# Patient Record
Sex: MALE | Race: WHITE | NOT HISPANIC OR LATINO | Employment: OTHER | URBAN - METROPOLITAN AREA
[De-identification: names, ages, dates, MRNs, and addresses within clinical notes are randomized per-mention and may not be internally consistent; named-entity substitution may affect disease eponyms.]

---

## 2024-04-24 ENCOUNTER — OFFICE VISIT (OUTPATIENT)
Dept: NEUROSURGERY | Facility: CLINIC | Age: 74
End: 2024-04-24
Payer: MEDICARE

## 2024-04-24 ENCOUNTER — PREP FOR SURGERY (OUTPATIENT)
Dept: OTHER | Facility: HOSPITAL | Age: 74
End: 2024-04-24
Payer: MEDICARE

## 2024-04-24 VITALS — HEIGHT: 73 IN | BODY MASS INDEX: 26.43 KG/M2 | TEMPERATURE: 97 F | WEIGHT: 199.4 LBS

## 2024-04-24 DIAGNOSIS — M48.062 SPINAL STENOSIS, LUMBAR REGION, WITH NEUROGENIC CLAUDICATION: Primary | ICD-10-CM

## 2024-04-24 DIAGNOSIS — M47.819 FACET ARTHROPATHY: ICD-10-CM

## 2024-04-24 DIAGNOSIS — M53.86 SCIATICA ASSOCIATED WITH DISORDER OF LUMBAR SPINE: Primary | ICD-10-CM

## 2024-04-24 PROBLEM — R29.818 NEUROGENIC CLAUDICATION: Status: ACTIVE | Noted: 2024-04-24

## 2024-04-24 PROCEDURE — 99204 OFFICE O/P NEW MOD 45 MIN: CPT | Performed by: NEUROLOGICAL SURGERY

## 2024-04-24 RX ORDER — LEVETIRACETAM 500 MG/1
TABLET ORAL
COMMUNITY
Start: 2024-02-10

## 2024-04-24 RX ORDER — ATORVASTATIN CALCIUM 80 MG/1
TABLET, FILM COATED ORAL
COMMUNITY
Start: 2024-01-11

## 2024-04-24 RX ORDER — SODIUM CHLORIDE 0.9 % (FLUSH) 0.9 %
3-10 SYRINGE (ML) INJECTION AS NEEDED
OUTPATIENT
Start: 2024-04-24

## 2024-04-24 RX ORDER — BUDESONIDE 9 MG/1
1 TABLET, FILM COATED, EXTENDED RELEASE ORAL DAILY
COMMUNITY
Start: 2024-02-09 | End: 2024-05-09

## 2024-04-24 RX ORDER — SODIUM CHLORIDE 0.9 % (FLUSH) 0.9 %
3 SYRINGE (ML) INJECTION EVERY 12 HOURS SCHEDULED
OUTPATIENT
Start: 2024-04-24

## 2024-04-24 RX ORDER — SODIUM CHLORIDE, SODIUM LACTATE, POTASSIUM CHLORIDE, CALCIUM CHLORIDE 600; 310; 30; 20 MG/100ML; MG/100ML; MG/100ML; MG/100ML
9 INJECTION, SOLUTION INTRAVENOUS CONTINUOUS
OUTPATIENT
Start: 2024-04-24

## 2024-04-24 RX ORDER — ACETAMINOPHEN 325 MG/1
650 TABLET ORAL ONCE
OUTPATIENT
Start: 2024-04-24 | End: 2024-04-24

## 2024-04-24 RX ORDER — METOPROLOL SUCCINATE 25 MG/1
TABLET, EXTENDED RELEASE ORAL
COMMUNITY
Start: 2024-03-29

## 2024-04-24 RX ORDER — APIXABAN 5 MG/1
TABLET, FILM COATED ORAL
COMMUNITY
Start: 2024-03-14

## 2024-04-24 RX ORDER — CHLORHEXIDINE GLUCONATE 40 MG/ML
SOLUTION TOPICAL
Qty: 120 ML | Refills: 0 | Status: SHIPPED | OUTPATIENT
Start: 2024-04-24

## 2024-04-24 RX ORDER — IBUPROFEN 800 MG/1
800 TABLET ORAL ONCE
OUTPATIENT
Start: 2024-04-24 | End: 2024-04-24

## 2024-04-24 RX ORDER — SODIUM CHLORIDE 9 MG/ML
40 INJECTION, SOLUTION INTRAVENOUS AS NEEDED
OUTPATIENT
Start: 2024-04-24

## 2024-04-24 RX ORDER — POTASSIUM CHLORIDE 750 MG/1
TABLET, FILM COATED, EXTENDED RELEASE ORAL
COMMUNITY
Start: 2024-04-08

## 2024-04-24 RX ORDER — SPIRONOLACTONE 25 MG/1
TABLET ORAL
COMMUNITY
Start: 2024-04-08

## 2024-04-24 RX ORDER — SACUBITRIL AND VALSARTAN 24; 26 MG/1; MG/1
TABLET, FILM COATED ORAL
COMMUNITY
Start: 2024-04-08

## 2024-04-24 RX ORDER — FAMOTIDINE 20 MG/1
20 TABLET, FILM COATED ORAL
OUTPATIENT
Start: 2024-04-24

## 2024-04-24 RX ORDER — DAPAGLIFLOZIN 10 MG/1
TABLET, FILM COATED ORAL
COMMUNITY
Start: 2024-03-15

## 2024-04-24 RX ORDER — AMIODARONE HYDROCHLORIDE 200 MG/1
TABLET ORAL
COMMUNITY
Start: 2024-03-17

## 2024-04-24 RX ORDER — HYDROCODONE BITARTRATE AND ACETAMINOPHEN 7.5; 325 MG/1; MG/1
1 TABLET ORAL ONCE
OUTPATIENT
Start: 2024-04-24 | End: 2024-04-24

## 2024-04-24 NOTE — PROGRESS NOTES
NAME: GALEN SALDAÑA   DOS: 2024  : 1950  PCP: Boy Williamson MD    Chief Complaint:    Chief Complaint   Patient presents with    Low Back & Right Leg Pain       History of Present Illness:  73 y.o. male   Saw a 73-year-old male neurosurgical consultation.  He has a history of chronic axial back pain is otherwise pretty healthy other than being on anticoagulation for A-fib and complains of back hip and leg pain on the right predominantly has been present for 6 months he has failed physical therapy and is quite miserable noticing a slight foot drop he is accompanied by his wife    PMHX  Allergies:  No Known Allergies  Medications    Current Outpatient Medications:     amiodarone (PACERONE) 200 MG tablet, , Disp: , Rfl:     atorvastatin (LIPITOR) 80 MG tablet, , Disp: , Rfl:     Budesonide ER 9 MG tablet sustained-release 24 hour, Take 1 tablet by mouth Daily., Disp: , Rfl:     Eliquis 5 MG tablet tablet, , Disp: , Rfl:     Entresto 24-26 MG tablet, , Disp: , Rfl:     Farxiga 10 MG tablet, , Disp: , Rfl:     levETIRAcetam (KEPPRA) 500 MG tablet, , Disp: , Rfl:     metoprolol succinate XL (TOPROL-XL) 25 MG 24 hr tablet, , Disp: , Rfl:     potassium chloride 10 MEQ CR tablet, , Disp: , Rfl:     spironolactone (ALDACTONE) 25 MG tablet, , Disp: , Rfl:   Past Medical History:  Past Medical History:   Diagnosis Date    Hypertension     Low back pain     Lumbosacral disc disease      Past Surgical History:  Past Surgical History:   Procedure Laterality Date    CATARACT EXTRACTION Bilateral     REPLACEMENT TOTAL KNEE Right      Social Hx:  Social History     Tobacco Use    Smoking status: Never    Smokeless tobacco: Never   Vaping Use    Vaping status: Never Used   Substance Use Topics    Alcohol use: Yes     Comment: occ.    Drug use: Never     Family Hx:  Family History   Family history unknown: Yes     Review of Systems:        Review of Systems   Constitutional:  Negative for activity change, appetite  change, chills, diaphoresis, fatigue, fever and unexpected weight change.   HENT:  Negative for congestion, dental problem, drooling, ear discharge, ear pain, facial swelling, hearing loss, mouth sores, nosebleeds, postnasal drip, rhinorrhea, sinus pressure, sinus pain, sneezing, sore throat, tinnitus, trouble swallowing and voice change.    Eyes:  Negative for photophobia, pain, discharge, redness, itching and visual disturbance.   Respiratory:  Negative for apnea, cough, choking, chest tightness, shortness of breath, wheezing and stridor.    Cardiovascular:  Negative for chest pain, palpitations and leg swelling.   Gastrointestinal:  Negative for abdominal distention, abdominal pain, anal bleeding, blood in stool, constipation, diarrhea, nausea, rectal pain and vomiting.   Endocrine: Negative for cold intolerance, heat intolerance, polydipsia, polyphagia and polyuria.   Genitourinary:  Negative for decreased urine volume, difficulty urinating, dysuria, enuresis, flank pain, frequency, genital sores, hematuria, penile discharge, penile pain, penile swelling, scrotal swelling, testicular pain and urgency.   Musculoskeletal:  Positive for back pain, gait problem and myalgias. Negative for arthralgias, joint swelling, neck pain and neck stiffness.   Skin:  Negative for color change, pallor, rash and wound.   Allergic/Immunologic: Negative for environmental allergies, food allergies and immunocompromised state.   Neurological:  Positive for dizziness and light-headedness. Negative for tremors, seizures, syncope, facial asymmetry, speech difficulty, weakness, numbness and headaches.   Hematological:  Negative for adenopathy. Does not bruise/bleed easily.   Psychiatric/Behavioral:  Negative for agitation, behavioral problems, confusion, decreased concentration, dysphoric mood, hallucinations, self-injury, sleep disturbance and suicidal ideas. The patient is not nervous/anxious and is not hyperactive.         I have  reviewed this note template and all pertinent parts of the review of systems social, family history, surgical history and medication list  Physical Examination:  Vitals:    04/24/24 1329   Temp: 97 °F (36.1 °C)      General Appearance:   Well developed, well nourished, well groomed, alert, and cooperative.  Neurological examination:  Neurologic Exam  Vital signs were reviewed and documented in the chart  Patient appeared in good neurologic function with normal comprehension fluent speech  Mood and affect are normal  Sense of smell deferred  Cranial nerves are intact    He may have a slight amount of hoarseness    He moves his arms symmetrically and has senile purpura throughout      He  Muscle bulk and tone normal  5 out of 5 strength no motor drift he does have right-sided foot weakness it is 4-5  Gait normal intact  Negative Romberg  No clonus long tract signs or myelopathy    Reflexes symmetrically absent  No edema noted and extremities skin appears normal    Straight leg raise sign absent  No signs of intrinsic hip dysfunction  Back is without any lesions or abnormality  Feet are warm and well perfused        Review of Imaging/DATA:  I personally reviewed his MRI of the lumbar spine shows quite impressive disease mostly that is chronically ankylosed.  He has critical high-grade central canal stenosis with cauda equina compression at the 4 5 area he also has left 5 1 disease but denies leg pain on that side 3 4 is modest  Diagnoses/Plan:    Mr. Le is a 73 y.o. male   1.  Right-sided foot weakness likely L5 related foot drop  2.  Lumbar degenerative disc quite advanced  3.  A-fib on Eliquis but was reportedly said by his cardiologist that he is free to have surgery  4.  Incidental left-sided 5 1 stenosis.    I explained the risk benefits and expected outcome of major elective surgery for their problem, complications from approach, and infection, the risk of neurologic implications after surgery as well as  need for repeat surgeries and most importantly failure to achieve quality of life improvement from the surgery to the patient.  Talked about all conceivable complications including CSF leak worsening weakness etc.    Plan on him    1.  Will plan L4-5 laminectomy and decompression for severe central spinal stenosis as well as left 5 1 foraminotomies  Will make arrangements

## 2024-05-06 PROBLEM — M53.86 SCIATICA ASSOCIATED WITH DISORDER OF LUMBAR SPINE: Status: ACTIVE | Noted: 2024-04-24

## 2024-05-07 ENCOUNTER — PRE-ADMISSION TESTING (OUTPATIENT)
Dept: PREADMISSION TESTING | Facility: HOSPITAL | Age: 74
End: 2024-05-07
Payer: MEDICARE

## 2024-05-07 VITALS — HEIGHT: 74 IN | WEIGHT: 197.97 LBS | BODY MASS INDEX: 25.41 KG/M2

## 2024-05-07 DIAGNOSIS — M53.86 SCIATICA ASSOCIATED WITH DISORDER OF LUMBAR SPINE: ICD-10-CM

## 2024-05-07 LAB
ANION GAP SERPL CALCULATED.3IONS-SCNC: 17 MMOL/L (ref 5–15)
BUN SERPL-MCNC: 15 MG/DL (ref 8–23)
BUN/CREAT SERPL: 16.1 (ref 7–25)
CALCIUM SPEC-SCNC: 9.2 MG/DL (ref 8.6–10.5)
CHLORIDE SERPL-SCNC: 104 MMOL/L (ref 98–107)
CO2 SERPL-SCNC: 17 MMOL/L (ref 22–29)
CREAT SERPL-MCNC: 0.93 MG/DL (ref 0.76–1.27)
DEPRECATED RDW RBC AUTO: 50.4 FL (ref 37–54)
EGFRCR SERPLBLD CKD-EPI 2021: 85.6 ML/MIN/1.73
ERYTHROCYTE [DISTWIDTH] IN BLOOD BY AUTOMATED COUNT: 14.2 % (ref 12.3–15.4)
GLUCOSE SERPL-MCNC: 131 MG/DL (ref 65–99)
HBA1C MFR BLD: 6.5 % (ref 4.8–5.6)
HCT VFR BLD AUTO: 51.6 % (ref 37.5–51)
HGB BLD-MCNC: 16.4 G/DL (ref 13–17.7)
INR PPP: 1.15 (ref 0.89–1.12)
MCH RBC QN AUTO: 30.7 PG (ref 26.6–33)
MCHC RBC AUTO-ENTMCNC: 31.8 G/DL (ref 31.5–35.7)
MCV RBC AUTO: 96.6 FL (ref 79–97)
PLATELET # BLD AUTO: 269 10*3/MM3 (ref 140–450)
PMV BLD AUTO: 9.3 FL (ref 6–12)
POTASSIUM SERPL-SCNC: 4.3 MMOL/L (ref 3.5–5.2)
PROTHROMBIN TIME: 14.8 SECONDS (ref 12.2–14.5)
RBC # BLD AUTO: 5.34 10*6/MM3 (ref 4.14–5.8)
SODIUM SERPL-SCNC: 138 MMOL/L (ref 136–145)
WBC NRBC COR # BLD AUTO: 9.48 10*3/MM3 (ref 3.4–10.8)

## 2024-05-07 PROCEDURE — 80048 BASIC METABOLIC PNL TOTAL CA: CPT

## 2024-05-07 PROCEDURE — 83036 HEMOGLOBIN GLYCOSYLATED A1C: CPT

## 2024-05-07 PROCEDURE — 85610 PROTHROMBIN TIME: CPT

## 2024-05-07 PROCEDURE — 93005 ELECTROCARDIOGRAM TRACING: CPT

## 2024-05-07 PROCEDURE — 85027 COMPLETE CBC AUTOMATED: CPT

## 2024-05-07 PROCEDURE — 87081 CULTURE SCREEN ONLY: CPT

## 2024-05-07 PROCEDURE — 36415 COLL VENOUS BLD VENIPUNCTURE: CPT

## 2024-05-07 RX ORDER — FUROSEMIDE 20 MG/1
20 TABLET ORAL 2 TIMES DAILY
COMMUNITY

## 2024-05-07 RX ORDER — EZETIMIBE 10 MG/1
10 TABLET ORAL DAILY
COMMUNITY

## 2024-05-08 LAB — MRSA SPEC QL CULT: NORMAL

## 2024-05-10 LAB
QT INTERVAL: 408 MS
QTC INTERVAL: 452 MS

## 2024-05-14 ENCOUNTER — ANESTHESIA EVENT (OUTPATIENT)
Dept: PERIOP | Facility: HOSPITAL | Age: 74
End: 2024-05-14
Payer: MEDICARE

## 2024-05-14 RX ORDER — SODIUM CHLORIDE 9 MG/ML
40 INJECTION, SOLUTION INTRAVENOUS AS NEEDED
Status: CANCELLED | OUTPATIENT
Start: 2024-05-14

## 2024-05-14 RX ORDER — SODIUM CHLORIDE 0.9 % (FLUSH) 0.9 %
10 SYRINGE (ML) INJECTION EVERY 12 HOURS SCHEDULED
Status: CANCELLED | OUTPATIENT
Start: 2024-05-14

## 2024-05-14 RX ORDER — SODIUM CHLORIDE 0.9 % (FLUSH) 0.9 %
10 SYRINGE (ML) INJECTION AS NEEDED
Status: CANCELLED | OUTPATIENT
Start: 2024-05-14

## 2024-05-15 ENCOUNTER — HOSPITAL ENCOUNTER (OUTPATIENT)
Facility: HOSPITAL | Age: 74
LOS: 1 days | Discharge: HOME | End: 2024-05-18
Attending: NEUROLOGICAL SURGERY | Admitting: NEUROLOGICAL SURGERY
Payer: MEDICARE

## 2024-05-15 ENCOUNTER — ANESTHESIA (OUTPATIENT)
Dept: PERIOP | Facility: HOSPITAL | Age: 74
End: 2024-05-15
Payer: MEDICARE

## 2024-05-15 ENCOUNTER — APPOINTMENT (OUTPATIENT)
Dept: GENERAL RADIOLOGY | Facility: HOSPITAL | Age: 74
End: 2024-05-15
Payer: MEDICARE

## 2024-05-15 DIAGNOSIS — R29.818 NEUROGENIC CLAUDICATION: Primary | ICD-10-CM

## 2024-05-15 DIAGNOSIS — M53.86 SCIATICA ASSOCIATED WITH DISORDER OF LUMBAR SPINE: ICD-10-CM

## 2024-05-15 LAB — GLUCOSE BLDC GLUCOMTR-MCNC: 153 MG/DL (ref 70–130)

## 2024-05-15 PROCEDURE — 63710000001 POTASSIUM CHLORIDE 10 MEQ CAPSULE CONTROLLED-RELEASE: Performed by: NEUROLOGICAL SURGERY

## 2024-05-15 PROCEDURE — C1889 IMPLANT/INSERT DEVICE, NOC: HCPCS | Performed by: NEUROLOGICAL SURGERY

## 2024-05-15 PROCEDURE — 25010000002 CEFAZOLIN PER 500 MG: Performed by: NEUROLOGICAL SURGERY

## 2024-05-15 PROCEDURE — 25010000002 DEXAMETHASONE SODIUM PHOSPHATE 10 MG/ML SOLUTION: Performed by: NURSE ANESTHETIST, CERTIFIED REGISTERED

## 2024-05-15 PROCEDURE — 25010000002 SUGAMMADEX 200 MG/2ML SOLUTION: Performed by: NURSE ANESTHETIST, CERTIFIED REGISTERED

## 2024-05-15 PROCEDURE — A9270 NON-COVERED ITEM OR SERVICE: HCPCS | Performed by: NEUROLOGICAL SURGERY

## 2024-05-15 PROCEDURE — 63710000001 LEVETIRACETAM 500 MG TABLET: Performed by: NEUROLOGICAL SURGERY

## 2024-05-15 PROCEDURE — 25010000002 BUPIVACAINE (PF) 0.25 % SOLUTION 30 ML VIAL: Performed by: NEUROLOGICAL SURGERY

## 2024-05-15 PROCEDURE — 63710000001 ATORVASTATIN 40 MG TABLET: Performed by: NEUROLOGICAL SURGERY

## 2024-05-15 PROCEDURE — 63710000001 FAMOTIDINE 20 MG TABLET: Performed by: NEUROLOGICAL SURGERY

## 2024-05-15 PROCEDURE — 63047 LAM FACETEC & FORAMOT LUMBAR: CPT | Performed by: NEUROLOGICAL SURGERY

## 2024-05-15 PROCEDURE — 25010000002 ALBUMIN HUMAN 5% PER 50 ML: Performed by: NURSE ANESTHETIST, CERTIFIED REGISTERED

## 2024-05-15 PROCEDURE — 63710000001 ACETAMINOPHEN 325 MG TABLET: Performed by: NEUROLOGICAL SURGERY

## 2024-05-15 PROCEDURE — 63047 LAM FACETEC & FORAMOT LUMBAR: CPT | Performed by: PHYSICIAN ASSISTANT

## 2024-05-15 PROCEDURE — 63710000001 SPIRONOLACTONE 25 MG TABLET: Performed by: NEUROLOGICAL SURGERY

## 2024-05-15 PROCEDURE — 76000 FLUOROSCOPY <1 HR PHYS/QHP: CPT

## 2024-05-15 PROCEDURE — 63048 LAM FACETEC &FORAMOT EA ADDL: CPT | Performed by: NEUROLOGICAL SURGERY

## 2024-05-15 PROCEDURE — 25010000002 ONDANSETRON PER 1 MG: Performed by: NURSE ANESTHETIST, CERTIFIED REGISTERED

## 2024-05-15 PROCEDURE — P9041 ALBUMIN (HUMAN),5%, 50ML: HCPCS | Performed by: NURSE ANESTHETIST, CERTIFIED REGISTERED

## 2024-05-15 PROCEDURE — 63710000001 TEMAZEPAM 15 MG CAPSULE: Performed by: NEUROLOGICAL SURGERY

## 2024-05-15 PROCEDURE — C1713 ANCHOR/SCREW BN/BN,TIS/BN: HCPCS | Performed by: NEUROLOGICAL SURGERY

## 2024-05-15 PROCEDURE — 25810000003 LACTATED RINGERS PER 1000 ML: Performed by: ANESTHESIOLOGY

## 2024-05-15 PROCEDURE — 25010000002 DEXAMETHASONE SODIUM PHOSPHATE 10 MG/ML SOLUTION 1 ML VIAL: Performed by: NEUROLOGICAL SURGERY

## 2024-05-15 PROCEDURE — 25010000002 PROPOFOL 10 MG/ML EMULSION: Performed by: NURSE ANESTHETIST, CERTIFIED REGISTERED

## 2024-05-15 PROCEDURE — 25010000002 SODIUM CHLORIDE 0.9 % WITH KCL 20 MEQ 20-0.9 MEQ/L-% SOLUTION: Performed by: NEUROLOGICAL SURGERY

## 2024-05-15 PROCEDURE — 63710000001 IBUPROFEN 800 MG TABLET: Performed by: NEUROLOGICAL SURGERY

## 2024-05-15 PROCEDURE — 63048 LAM FACETEC &FORAMOT EA ADDL: CPT | Performed by: PHYSICIAN ASSISTANT

## 2024-05-15 PROCEDURE — 25810000003 LACTATED RINGERS PER 1000 ML: Performed by: NEUROLOGICAL SURGERY

## 2024-05-15 PROCEDURE — 63710000001 HYDROCODONE-ACETAMINOPHEN 7.5-325 MG TABLET: Performed by: NEUROLOGICAL SURGERY

## 2024-05-15 PROCEDURE — 82948 REAGENT STRIP/BLOOD GLUCOSE: CPT

## 2024-05-15 PROCEDURE — 63710000001 SENNOSIDES-DOCUSATE 8.6-50 MG TABLET: Performed by: NEUROLOGICAL SURGERY

## 2024-05-15 DEVICE — HEMOST ABS SURGIFOAM SZ100 8X12 10MM: Type: IMPLANTABLE DEVICE | Site: SPINE LUMBAR | Status: FUNCTIONAL

## 2024-05-15 DEVICE — FLOSEAL HEMOSTATIC MATRIX, 10ML
Type: IMPLANTABLE DEVICE | Site: SPINE LUMBAR | Status: FUNCTIONAL
Brand: FLOSEAL HEMOSTATIC MATRIX

## 2024-05-15 DEVICE — SEAL DURL ADHERUS/AUTOSPRAY HYDROGEL DS: Type: IMPLANTABLE DEVICE | Site: SPINE LUMBAR | Status: FUNCTIONAL

## 2024-05-15 DEVICE — SSC BONE WAX
Type: IMPLANTABLE DEVICE | Site: SPINE LUMBAR | Status: FUNCTIONAL
Brand: SSC BONE WAX

## 2024-05-15 DEVICE — DURAGEN® PLUS DURAL REGENERATION MATRIX, 1 IN X 3 IN (2.5 CM X 7.5 CM)
Type: IMPLANTABLE DEVICE | Site: SPINE LUMBAR | Status: FUNCTIONAL
Brand: DURAGEN® PLUS

## 2024-05-15 RX ORDER — MAGNESIUM HYDROXIDE 1200 MG/15ML
LIQUID ORAL AS NEEDED
Status: DISCONTINUED | OUTPATIENT
Start: 2024-05-15 | End: 2024-05-15 | Stop reason: HOSPADM

## 2024-05-15 RX ORDER — PROMETHAZINE HYDROCHLORIDE 25 MG/1
25 TABLET ORAL ONCE AS NEEDED
Status: DISCONTINUED | OUTPATIENT
Start: 2024-05-15 | End: 2024-05-15 | Stop reason: HOSPADM

## 2024-05-15 RX ORDER — ATORVASTATIN CALCIUM 40 MG/1
80 TABLET, FILM COATED ORAL NIGHTLY
Status: DISCONTINUED | OUTPATIENT
Start: 2024-05-15 | End: 2024-05-18 | Stop reason: HOSPADM

## 2024-05-15 RX ORDER — ONDANSETRON 2 MG/ML
4 INJECTION INTRAMUSCULAR; INTRAVENOUS ONCE AS NEEDED
Status: DISCONTINUED | OUTPATIENT
Start: 2024-05-15 | End: 2024-05-15 | Stop reason: HOSPADM

## 2024-05-15 RX ORDER — SPIRONOLACTONE 25 MG/1
25 TABLET ORAL 2 TIMES DAILY
Status: DISCONTINUED | OUTPATIENT
Start: 2024-05-15 | End: 2024-05-18 | Stop reason: HOSPADM

## 2024-05-15 RX ORDER — BISACODYL 5 MG/1
5 TABLET, DELAYED RELEASE ORAL DAILY PRN
Status: DISCONTINUED | OUTPATIENT
Start: 2024-05-15 | End: 2024-05-18 | Stop reason: HOSPADM

## 2024-05-15 RX ORDER — ONDANSETRON 2 MG/ML
INJECTION INTRAMUSCULAR; INTRAVENOUS AS NEEDED
Status: DISCONTINUED | OUTPATIENT
Start: 2024-05-15 | End: 2024-05-15 | Stop reason: SURG

## 2024-05-15 RX ORDER — PROMETHAZINE HYDROCHLORIDE 25 MG/1
25 SUPPOSITORY RECTAL ONCE AS NEEDED
Status: DISCONTINUED | OUTPATIENT
Start: 2024-05-15 | End: 2024-05-15 | Stop reason: HOSPADM

## 2024-05-15 RX ORDER — SODIUM CHLORIDE 0.9 % (FLUSH) 0.9 %
3 SYRINGE (ML) INJECTION EVERY 12 HOURS SCHEDULED
Status: DISCONTINUED | OUTPATIENT
Start: 2024-05-15 | End: 2024-05-15 | Stop reason: HOSPADM

## 2024-05-15 RX ORDER — SODIUM CHLORIDE, SODIUM LACTATE, POTASSIUM CHLORIDE, CALCIUM CHLORIDE 600; 310; 30; 20 MG/100ML; MG/100ML; MG/100ML; MG/100ML
100 INJECTION, SOLUTION INTRAVENOUS CONTINUOUS
Status: DISCONTINUED | OUTPATIENT
Start: 2024-05-15 | End: 2024-05-15

## 2024-05-15 RX ORDER — SODIUM CHLORIDE 9 MG/ML
40 INJECTION, SOLUTION INTRAVENOUS AS NEEDED
Status: DISCONTINUED | OUTPATIENT
Start: 2024-05-15 | End: 2024-05-18 | Stop reason: HOSPADM

## 2024-05-15 RX ORDER — DEXAMETHASONE SODIUM PHOSPHATE 10 MG/ML
INJECTION, SOLUTION INTRAMUSCULAR; INTRAVENOUS AS NEEDED
Status: DISCONTINUED | OUTPATIENT
Start: 2024-05-15 | End: 2024-05-15 | Stop reason: SURG

## 2024-05-15 RX ORDER — BISACODYL 10 MG
10 SUPPOSITORY, RECTAL RECTAL DAILY PRN
Status: DISCONTINUED | OUTPATIENT
Start: 2024-05-15 | End: 2024-05-18 | Stop reason: HOSPADM

## 2024-05-15 RX ORDER — SODIUM CHLORIDE AND POTASSIUM CHLORIDE 150; 900 MG/100ML; MG/100ML
75 INJECTION, SOLUTION INTRAVENOUS CONTINUOUS
Status: DISCONTINUED | OUTPATIENT
Start: 2024-05-15 | End: 2024-05-18 | Stop reason: HOSPADM

## 2024-05-15 RX ORDER — HYDROCODONE BITARTRATE AND ACETAMINOPHEN 5; 325 MG/1; MG/1
1 TABLET ORAL EVERY 4 HOURS PRN
Status: DISCONTINUED | OUTPATIENT
Start: 2024-05-15 | End: 2024-05-18 | Stop reason: HOSPADM

## 2024-05-15 RX ORDER — BUDESONIDE 9 MG/1
9 TABLET, FILM COATED, EXTENDED RELEASE ORAL DAILY
COMMUNITY

## 2024-05-15 RX ORDER — LIDOCAINE HYDROCHLORIDE 10 MG/ML
0.5 INJECTION, SOLUTION EPIDURAL; INFILTRATION; INTRACAUDAL; PERINEURAL ONCE AS NEEDED
Status: COMPLETED | OUTPATIENT
Start: 2024-05-15 | End: 2024-05-15

## 2024-05-15 RX ORDER — FUROSEMIDE 20 MG/1
20 TABLET ORAL DAILY
Status: DISCONTINUED | OUTPATIENT
Start: 2024-05-16 | End: 2024-05-18 | Stop reason: HOSPADM

## 2024-05-15 RX ORDER — LIDOCAINE HYDROCHLORIDE AND EPINEPHRINE 5; 5 MG/ML; UG/ML
INJECTION, SOLUTION INFILTRATION; PERINEURAL AS NEEDED
Status: DISCONTINUED | OUTPATIENT
Start: 2024-05-15 | End: 2024-05-15 | Stop reason: HOSPADM

## 2024-05-15 RX ORDER — SODIUM CHLORIDE, SODIUM LACTATE, POTASSIUM CHLORIDE, CALCIUM CHLORIDE 600; 310; 30; 20 MG/100ML; MG/100ML; MG/100ML; MG/100ML
9 INJECTION, SOLUTION INTRAVENOUS CONTINUOUS
Status: DISCONTINUED | OUTPATIENT
Start: 2024-05-15 | End: 2024-05-18 | Stop reason: HOSPADM

## 2024-05-15 RX ORDER — NALOXONE HCL 0.4 MG/ML
0.4 VIAL (ML) INJECTION AS NEEDED
Status: DISCONTINUED | OUTPATIENT
Start: 2024-05-15 | End: 2024-05-15 | Stop reason: HOSPADM

## 2024-05-15 RX ORDER — FENTANYL CITRATE 50 UG/ML
50 INJECTION, SOLUTION INTRAMUSCULAR; INTRAVENOUS
Status: DISCONTINUED | OUTPATIENT
Start: 2024-05-15 | End: 2024-05-15 | Stop reason: HOSPADM

## 2024-05-15 RX ORDER — ONDANSETRON 4 MG/1
4 TABLET, ORALLY DISINTEGRATING ORAL EVERY 6 HOURS PRN
Status: DISCONTINUED | OUTPATIENT
Start: 2024-05-15 | End: 2024-05-18 | Stop reason: HOSPADM

## 2024-05-15 RX ORDER — ROCURONIUM BROMIDE 10 MG/ML
INJECTION, SOLUTION INTRAVENOUS AS NEEDED
Status: DISCONTINUED | OUTPATIENT
Start: 2024-05-15 | End: 2024-05-15 | Stop reason: SURG

## 2024-05-15 RX ORDER — AMIODARONE HYDROCHLORIDE 200 MG/1
200 TABLET ORAL DAILY
Status: DISCONTINUED | OUTPATIENT
Start: 2024-05-16 | End: 2024-05-18 | Stop reason: HOSPADM

## 2024-05-15 RX ORDER — POTASSIUM CHLORIDE 750 MG/1
10 CAPSULE, EXTENDED RELEASE ORAL ONCE
Qty: 1 CAPSULE | Refills: 0 | Status: COMPLETED | OUTPATIENT
Start: 2024-05-15 | End: 2024-05-15

## 2024-05-15 RX ORDER — TEMAZEPAM 15 MG/1
15 CAPSULE ORAL NIGHTLY PRN
Status: DISCONTINUED | OUTPATIENT
Start: 2024-05-15 | End: 2024-05-18 | Stop reason: HOSPADM

## 2024-05-15 RX ORDER — IPRATROPIUM BROMIDE AND ALBUTEROL SULFATE 2.5; .5 MG/3ML; MG/3ML
3 SOLUTION RESPIRATORY (INHALATION) ONCE AS NEEDED
Status: DISCONTINUED | OUTPATIENT
Start: 2024-05-15 | End: 2024-05-15 | Stop reason: HOSPADM

## 2024-05-15 RX ORDER — SODIUM CHLORIDE 0.9 % (FLUSH) 0.9 %
3-10 SYRINGE (ML) INJECTION AS NEEDED
Status: DISCONTINUED | OUTPATIENT
Start: 2024-05-15 | End: 2024-05-15 | Stop reason: HOSPADM

## 2024-05-15 RX ORDER — ONDANSETRON 2 MG/ML
4 INJECTION INTRAMUSCULAR; INTRAVENOUS EVERY 6 HOURS PRN
Status: DISCONTINUED | OUTPATIENT
Start: 2024-05-15 | End: 2024-05-18 | Stop reason: HOSPADM

## 2024-05-15 RX ORDER — ALBUMIN, HUMAN INJ 5% 5 %
SOLUTION INTRAVENOUS CONTINUOUS PRN
Status: DISCONTINUED | OUTPATIENT
Start: 2024-05-15 | End: 2024-05-15 | Stop reason: SURG

## 2024-05-15 RX ORDER — LEVETIRACETAM 500 MG/1
500 TABLET ORAL 2 TIMES DAILY
Status: DISCONTINUED | OUTPATIENT
Start: 2024-05-15 | End: 2024-05-18 | Stop reason: HOSPADM

## 2024-05-15 RX ORDER — LABETALOL HYDROCHLORIDE 5 MG/ML
5 INJECTION, SOLUTION INTRAVENOUS
Status: DISCONTINUED | OUTPATIENT
Start: 2024-05-15 | End: 2024-05-15 | Stop reason: HOSPADM

## 2024-05-15 RX ORDER — SODIUM CHLORIDE 0.9 % (FLUSH) 0.9 %
10 SYRINGE (ML) INJECTION EVERY 12 HOURS SCHEDULED
Status: DISCONTINUED | OUTPATIENT
Start: 2024-05-15 | End: 2024-05-18 | Stop reason: HOSPADM

## 2024-05-15 RX ORDER — HYDRALAZINE HYDROCHLORIDE 20 MG/ML
5 INJECTION INTRAMUSCULAR; INTRAVENOUS
Status: DISCONTINUED | OUTPATIENT
Start: 2024-05-15 | End: 2024-05-15 | Stop reason: HOSPADM

## 2024-05-15 RX ORDER — EPHEDRINE SULFATE 50 MG/ML
INJECTION INTRAVENOUS AS NEEDED
Status: DISCONTINUED | OUTPATIENT
Start: 2024-05-15 | End: 2024-05-15 | Stop reason: SURG

## 2024-05-15 RX ORDER — HYDROCODONE BITARTRATE AND ACETAMINOPHEN 7.5; 325 MG/1; MG/1
1 TABLET ORAL ONCE
Status: COMPLETED | OUTPATIENT
Start: 2024-05-15 | End: 2024-05-15

## 2024-05-15 RX ORDER — METOPROLOL SUCCINATE 25 MG/1
25 TABLET, EXTENDED RELEASE ORAL DAILY
Status: DISCONTINUED | OUTPATIENT
Start: 2024-05-16 | End: 2024-05-18 | Stop reason: HOSPADM

## 2024-05-15 RX ORDER — SODIUM CHLORIDE 0.9 % (FLUSH) 0.9 %
10 SYRINGE (ML) INJECTION AS NEEDED
Status: DISCONTINUED | OUTPATIENT
Start: 2024-05-15 | End: 2024-05-18 | Stop reason: HOSPADM

## 2024-05-15 RX ORDER — PHENYLEPHRINE HCL IN 0.9% NACL 1 MG/10 ML
SYRINGE (ML) INTRAVENOUS AS NEEDED
Status: DISCONTINUED | OUTPATIENT
Start: 2024-05-15 | End: 2024-05-15 | Stop reason: SURG

## 2024-05-15 RX ORDER — POLYETHYLENE GLYCOL 3350 17 G/17G
17 POWDER, FOR SOLUTION ORAL DAILY PRN
Status: DISCONTINUED | OUTPATIENT
Start: 2024-05-15 | End: 2024-05-18 | Stop reason: HOSPADM

## 2024-05-15 RX ORDER — BUDESONIDE 3 MG/1
6 CAPSULE, COATED PELLETS ORAL DAILY
Status: DISCONTINUED | OUTPATIENT
Start: 2024-05-16 | End: 2024-05-16

## 2024-05-15 RX ORDER — AMOXICILLIN 250 MG
2 CAPSULE ORAL 2 TIMES DAILY
Status: DISCONTINUED | OUTPATIENT
Start: 2024-05-15 | End: 2024-05-18 | Stop reason: HOSPADM

## 2024-05-15 RX ORDER — IBUPROFEN 800 MG/1
800 TABLET ORAL ONCE
Status: COMPLETED | OUTPATIENT
Start: 2024-05-15 | End: 2024-05-15

## 2024-05-15 RX ORDER — FAMOTIDINE 20 MG/1
20 TABLET, FILM COATED ORAL
Status: COMPLETED | OUTPATIENT
Start: 2024-05-15 | End: 2024-05-15

## 2024-05-15 RX ORDER — HYDROMORPHONE HYDROCHLORIDE 1 MG/ML
0.5 INJECTION, SOLUTION INTRAMUSCULAR; INTRAVENOUS; SUBCUTANEOUS
Status: DISCONTINUED | OUTPATIENT
Start: 2024-05-15 | End: 2024-05-15 | Stop reason: HOSPADM

## 2024-05-15 RX ORDER — LIDOCAINE HYDROCHLORIDE 10 MG/ML
INJECTION, SOLUTION EPIDURAL; INFILTRATION; INTRACAUDAL; PERINEURAL AS NEEDED
Status: DISCONTINUED | OUTPATIENT
Start: 2024-05-15 | End: 2024-05-15 | Stop reason: SURG

## 2024-05-15 RX ORDER — IBUPROFEN 400 MG/1
200 TABLET ORAL EVERY 4 HOURS PRN
Status: DISCONTINUED | OUTPATIENT
Start: 2024-05-15 | End: 2024-05-18 | Stop reason: HOSPADM

## 2024-05-15 RX ORDER — MIDAZOLAM HYDROCHLORIDE 1 MG/ML
0.5 INJECTION INTRAMUSCULAR; INTRAVENOUS
Status: DISCONTINUED | OUTPATIENT
Start: 2024-05-15 | End: 2024-05-15 | Stop reason: HOSPADM

## 2024-05-15 RX ORDER — PROPOFOL 10 MG/ML
VIAL (ML) INTRAVENOUS AS NEEDED
Status: DISCONTINUED | OUTPATIENT
Start: 2024-05-15 | End: 2024-05-15 | Stop reason: SURG

## 2024-05-15 RX ORDER — SODIUM CHLORIDE 9 MG/ML
40 INJECTION, SOLUTION INTRAVENOUS AS NEEDED
Status: DISCONTINUED | OUTPATIENT
Start: 2024-05-15 | End: 2024-05-15 | Stop reason: HOSPADM

## 2024-05-15 RX ORDER — ACETAMINOPHEN 325 MG/1
650 TABLET ORAL ONCE
Status: COMPLETED | OUTPATIENT
Start: 2024-05-15 | End: 2024-05-15

## 2024-05-15 RX ADMIN — POTASSIUM CHLORIDE AND SODIUM CHLORIDE 75 ML/HR: 900; 150 INJECTION, SOLUTION INTRAVENOUS at 17:53

## 2024-05-15 RX ADMIN — ROCURONIUM BROMIDE 10 MG: 10 INJECTION INTRAVENOUS at 10:30

## 2024-05-15 RX ADMIN — LEVETIRACETAM 500 MG: 500 TABLET, FILM COATED ORAL at 20:12

## 2024-05-15 RX ADMIN — SUGAMMADEX 200 MG: 100 INJECTION, SOLUTION INTRAVENOUS at 12:14

## 2024-05-15 RX ADMIN — SODIUM CHLORIDE, POTASSIUM CHLORIDE, SODIUM LACTATE AND CALCIUM CHLORIDE 9 ML/HR: 600; 310; 30; 20 INJECTION, SOLUTION INTRAVENOUS at 09:17

## 2024-05-15 RX ADMIN — LIDOCAINE HYDROCHLORIDE 50 MG: 10 INJECTION, SOLUTION EPIDURAL; INFILTRATION; INTRACAUDAL; PERINEURAL at 09:50

## 2024-05-15 RX ADMIN — ROCURONIUM BROMIDE 10 MG: 10 INJECTION INTRAVENOUS at 11:30

## 2024-05-15 RX ADMIN — SENNOSIDES AND DOCUSATE SODIUM 2 TABLET: 8.6; 5 TABLET ORAL at 20:12

## 2024-05-15 RX ADMIN — ROCURONIUM BROMIDE 10 MG: 10 INJECTION INTRAVENOUS at 11:00

## 2024-05-15 RX ADMIN — Medication 100 MCG: at 09:55

## 2024-05-15 RX ADMIN — EPHEDRINE SULFATE 10 MG: 50 INJECTION INTRAVENOUS at 10:11

## 2024-05-15 RX ADMIN — DEXAMETHASONE SODIUM PHOSPHATE 10 MG: 10 INJECTION, SOLUTION INTRAMUSCULAR; INTRAVENOUS at 09:53

## 2024-05-15 RX ADMIN — IBUPROFEN 800 MG: 800 TABLET, FILM COATED ORAL at 09:20

## 2024-05-15 RX ADMIN — EPHEDRINE SULFATE 5 MG: 50 INJECTION INTRAVENOUS at 12:07

## 2024-05-15 RX ADMIN — ALBUMIN (HUMAN): 12.5 INJECTION, SOLUTION INTRAVENOUS at 12:00

## 2024-05-15 RX ADMIN — Medication 100 MCG: at 10:38

## 2024-05-15 RX ADMIN — EPHEDRINE SULFATE 5 MG: 50 INJECTION INTRAVENOUS at 11:20

## 2024-05-15 RX ADMIN — FAMOTIDINE 20 MG: 20 TABLET, FILM COATED ORAL at 09:20

## 2024-05-15 RX ADMIN — ROCURONIUM BROMIDE 50 MG: 10 INJECTION INTRAVENOUS at 09:50

## 2024-05-15 RX ADMIN — EPHEDRINE SULFATE 10 MG: 50 INJECTION INTRAVENOUS at 09:53

## 2024-05-15 RX ADMIN — POTASSIUM CHLORIDE 10 MEQ: 750 CAPSULE, EXTENDED RELEASE ORAL at 17:53

## 2024-05-15 RX ADMIN — SPIRONOLACTONE 25 MG: 25 TABLET ORAL at 20:12

## 2024-05-15 RX ADMIN — HYDROCODONE BITARTRATE AND ACETAMINOPHEN 1 TABLET: 7.5; 325 TABLET ORAL at 09:20

## 2024-05-15 RX ADMIN — PROPOFOL 150 MG: 10 INJECTION, EMULSION INTRAVENOUS at 09:50

## 2024-05-15 RX ADMIN — TEMAZEPAM 15 MG: 15 CAPSULE ORAL at 20:12

## 2024-05-15 RX ADMIN — EPHEDRINE SULFATE 5 MG: 50 INJECTION INTRAVENOUS at 10:38

## 2024-05-15 RX ADMIN — ALBUMIN (HUMAN): 12.5 INJECTION, SOLUTION INTRAVENOUS at 11:45

## 2024-05-15 RX ADMIN — ACETAMINOPHEN 650 MG: 325 TABLET ORAL at 09:20

## 2024-05-15 RX ADMIN — SODIUM CHLORIDE 2 G: 900 INJECTION INTRAVENOUS at 09:46

## 2024-05-15 RX ADMIN — ATORVASTATIN CALCIUM 80 MG: 40 TABLET, FILM COATED ORAL at 20:12

## 2024-05-15 RX ADMIN — SODIUM CHLORIDE, POTASSIUM CHLORIDE, SODIUM LACTATE AND CALCIUM CHLORIDE: 600; 310; 30; 20 INJECTION, SOLUTION INTRAVENOUS at 12:14

## 2024-05-15 RX ADMIN — LIDOCAINE HYDROCHLORIDE 0.5 ML: 10 INJECTION, SOLUTION EPIDURAL; INFILTRATION; INTRACAUDAL; PERINEURAL at 09:17

## 2024-05-15 RX ADMIN — ONDANSETRON 4 MG: 2 INJECTION INTRAMUSCULAR; INTRAVENOUS at 12:05

## 2024-05-15 NOTE — ANESTHESIA POSTPROCEDURE EVALUATION
Patient: Isaiah Le    Procedure Summary       Date: 05/15/24 [facility] / [facility]  [facility]    Anesthesia Start: 0946 Anesthesia Stop: 1246    Procedure: Lumbar laminectomy L4-5 open, LEFT L5-S1 FORAMINOTOMY (Spine Lumbar) Diagnosis:       Sciatica associated with disorder of lumbar spine      (Sciatica associated with disorder of lumbar spine [M53.86])    Surgeons: Abbe Nice MD Provider: Ruiz Alejo MD    Anesthesia Type: general ASA Status: 3            Anesthesia Type: general    Vitals  Vitals Value Taken Time   /74 05/15/24 1242   Temp 97.4 °F (36.3 °C) 05/15/24 1242   Pulse 81 05/15/24 1245   Resp 20 05/15/24 1242   SpO2 93 % 05/15/24 1245   Vitals shown include unfiled device data.        Post Anesthesia Care and Evaluation    Patient location during evaluation: PACU  Patient participation: waiting for patient participation  Level of consciousness: sleepy but conscious  Pain score: 0  Pain management: adequate    Airway patency: patent  Anesthetic complications: No anesthetic complications  PONV Status: none  Cardiovascular status: hemodynamically stable and acceptable  Respiratory status: nonlabored ventilation, acceptable and nasal cannula  Hydration status: acceptable

## 2024-05-15 NOTE — OP NOTE
NEUROSURGICAL OPERATIVE NOTE        PREOPERATIVE DIAGNOSIS:    Severe spinal stenosis neurogenic claudication right leg pain         POSTOPERATIVE DIAGNOSIS:  Same      PROCEDURE:  1.  Laminectomy L4, L5 complete, partial L3 inferior one half, right-sided foraminotomies L3-4, bilateral foraminotomies L4-5, left L5-S1 decompression of the L3-4, L4-5 and L5-S1 areas      2.  Pinhole durotomy noted and repaired    SURGEON:  Abbe Nice M.D.      ASSISTANT: NIMO Haynes was responsible for performing the following activities: Suction and their skilled assistance was necessary for the success of this case.    ANESTHESIA:  General      ESTIMATED BLOOD LOSS:  Minimal      SPECIMEN: None      DRAINS: Flat SANA      COMPLICATIONS:  None apparent          PROCEDURE IN DETAIL:  Is a gentleman with miserable neurogenic claudication and chronic right leg pain elected to undergo surgery but explained this may be part of a different set of procedures with failure given the complexity of his arthritic symptomatology    He was taken the operating room endotracheal antibiotic and preoperative antimicrobial prophylaxis he is rolled prone on the Terrence frame and fluoroscopic imaging identified the correct levels and a midline incision was made.  Dissection was carried down the skin skin and subcutaneous tissues the spine was exposed the inferior portion of L3 the L4 and L5 lamina diffuse arthritis was noted with some scoliosis.  The posterior bones were removed from the inferior portion of the L3 the L4 and 5 lamina given the diffuse arthritic symptomatology high-speed bur send everything eggshell thin.  At this point time the ligament was removed and the midline sequentially the lateral recesses were cleared of all the ligament tissue of the nerve roots right-sided foraminotomies was necessary at 3 4 and decompression of 3 4 level given the severe nature of the cephalad portion of the ligament encompassing the  right for foraminal decompression at 3 4 bilateral L4-5 foraminotomies as well as bilateral L5-S1 foraminotomies were also performed after decompression of the posterior laminar areas with decompression being completed at the 3445 and 5 1 areas   The dura was markedly patchy was thinned there was an area of dehiscence dural with arachnoid noted that was closed with a 6-0 Prolene to ensure adequate closure the small piece of DuraGen was placed on the underside of the dural area for reinforcement    Some adherence was placed with DuraGen over the dorsal areas after meticulous hemostasis maintained there is no evidence CSF leaks    A small SANA drain was placed and skin was closed in layers            Abbe LOPEZ

## 2024-05-15 NOTE — PROGRESS NOTES
I saw and personally examined the patient there is been no change in plan the majority of his is right-sided hip pain to the knee he has been off Eliquis I explained the need for additional surgeries given the complexity of his back    Will make arrangements for L4-5 laminectomy aggressive right-sided foraminotomies at 4 5 level and left-sided L5-S1 foraminotomy

## 2024-05-15 NOTE — ANESTHESIA PREPROCEDURE EVALUATION
Anesthesia Evaluation                  Airway   Mallampati: I  TM distance: >3 FB  Neck ROM: full  No difficulty expected  Dental      Pulmonary    Cardiovascular     ECG reviewed    (+) hypertension, dysrhythmias Atrial Fib      Neuro/Psych  (+) numbness  GI/Hepatic/Renal/Endo      Musculoskeletal     Abdominal    Substance History      OB/GYN          Other                    Anesthesia Plan    ASA 3     general     intravenous induction     Anesthetic plan, risks, benefits, and alternatives have been provided, discussed and informed consent has been obtained with: patient.    Plan discussed with CRNA.    CODE STATUS:

## 2024-05-15 NOTE — H&P
Pre-Op H&P  Isaiah Le  [id number]  1950      Chief complaint: Back pain      Subjective:  Patient is a 73 y.o.male presents for scheduled surgery by Dr. Nice. He anticipates a Lumbar laminectomy L4-5 open, LEFT L5-S1 FORAMINOTOMY today.  The patient has had back pain since August of 2023.  He has pain that shoots down to his right knee when he is walking.  He denies having any other associated symptoms with his present condition.  He has frequent falls.  He does use a cane to ambulate.    + Cardiac clearance in chart from 4/4/2024.  The last dose of Eliquis was 3 days ago on 5/12/2024.    Review of Systems:  Constitutional-- No fever, chills or sweats. No fatigue.  CV-- No chest pain, palpitation or syncope. +HTN, HLD. + A-fib  Resp-- No SOB, cough, hemoptysis  Skin--No rashes or lesions      Allergies: No Known Allergies      Home Meds:  Medications Prior to Admission   Medication Sig Dispense Refill Last Dose    amiodarone (PACERONE) 200 MG tablet Take 1 tablet by mouth Daily.   5/15/2024 at 0700    atorvastatin (LIPITOR) 80 MG tablet Take 1 tablet by mouth Every Night.   5/14/2024 at 2100    Budesonide ER 9 MG tablet sustained-release 24 hour Take 9 mg by mouth Daily.   5/15/2024 at 0700    Chlorhexidine Gluconate 4 % solution Shower each day with solution for 5 days beginning 5 days before surgery. 120 mL 0 5/14/2024 at 2100    Entresto 24-26 MG tablet Take 1 tablet by mouth 2 (Two) Times a Day.   5/15/2024 at 0700    ezetimibe (ZETIA) 10 MG tablet Take 1 tablet by mouth Daily.   5/15/2024 at 0700    Farxiga 10 MG tablet Take 10 mg by mouth Daily.   5/15/2024 at 0700    furosemide (LASIX) 20 MG tablet Take 1 tablet by mouth Daily.   5/15/2024 at 0700    levETIRAcetam (KEPPRA) 500 MG tablet Take 1 tablet by mouth 2 (Two) Times a Day.   5/15/2024    metoprolol succinate XL (TOPROL-XL) 25 MG 24 hr tablet Take 1 tablet by mouth Daily.   5/15/2024 at 0700    mupirocin (BACTROBAN) 2 % nasal  "ointment Apply to the inside of each nostril with a cotton swab two times daily, morning and evening, for 5 days before surgery. 10 each 0 5/14/2024 at 2100    potassium chloride 10 MEQ CR tablet Take 1 tablet by mouth 2 (Two) Times a Day.   5/15/2024 at 0700    spironolactone (ALDACTONE) 25 MG tablet Take 1 tablet by mouth 2 (Two) Times a Day.   5/15/2024 at 0700    Eliquis 5 MG tablet tablet Take 1 tablet by mouth 2 (Two) Times a Day. PATIENT TO HOLD THREE DAYS PRIOR TO SURGERY ON 05/15/2024 PER CARDIOLOGIST. PATIENT AWARE.   5/11/2024 at 2100         PMH:   Past Medical History:   Diagnosis Date    Atrial fibrillation     History of transfusion     Hyperlipidemia     Hypertension     Lumbosacral disc disease     Ulcerative colitis     Wears dentures     UPPER AND LOWER    Wears glasses     Wears hearing aid in both ears      PSH:    Past Surgical History:   Procedure Laterality Date    CATARACT EXTRACTION Bilateral     COLONOSCOPY      WITH POLYPS REMOVED    REPLACEMENT TOTAL KNEE Right        Immunization History:  Influenza: No  Pneumococcal: No  Tetanus: No  Covid : x4    Social History:   Tobacco:   Social History     Tobacco Use   Smoking Status Never   Smokeless Tobacco Never      Alcohol:     Social History     Substance and Sexual Activity   Alcohol Use Yes    Alcohol/week: 2.0 standard drinks of alcohol    Types: 2 Cans of beer per week    Comment: DAILY         Physical Exam:/72 (BP Location: Right arm, Patient Position: Lying)   Pulse 72   Temp 97.3 °F (36.3 °C) (Temporal)   Resp 18   Ht 186.7 cm (73.5\")   Wt 89.4 kg (197 lb)   SpO2 94%   BMI 25.64 kg/m²       General Appearance:    Alert, cooperative, no distress, appears stated age   Head:    Normocephalic, without obvious abnormality, atraumatic   Lungs:     Clear to auscultation bilaterally, respirations unlabored    Heart:   Regular rate and rhythm, S1 and S2 normal    Abdomen:    Soft without tenderness   Extremities:   Extremities " normal, atraumatic, no cyanosis or edema   Skin:   Skin color, texture, turgor normal, no rashes or lesions   Neurologic:   Grossly intact     Results Review:     LABS:  Lab Results   Component Value Date    WBC 9.48 05/07/2024    HGB 16.4 05/07/2024    HCT 51.6 (H) 05/07/2024    MCV 96.6 05/07/2024     05/07/2024    GLUCOSE 131 (H) 05/07/2024    BUN 15 05/07/2024    CREATININE 0.93 05/07/2024     05/07/2024    K 4.3 05/07/2024     05/07/2024    CO2 17.0 (L) 05/07/2024    CALCIUM 9.2 05/07/2024       RADIOLOGY:  Imaging Results (Last 72 Hours)       ** No results found for the last 72 hours. **            I reviewed the patient's new clinical results.    Cancer Staging (if applicable)  Cancer Patient: __ yes _x_no __unknown; If yes, clinical stage T:__ N:__M:__, stage group or __N/A      Impression: Sciatica associated with disorder of lumbar spine      Plan: Lumbar laminectomy L4-5 open, LEFT L5-S1 FORAMINOTOMY       Osvaldo Mike, APRN   5/15/2024   09:40 EDT

## 2024-05-15 NOTE — ANESTHESIA PROCEDURE NOTES
Airway  Urgency: elective    Date/Time: 5/15/2024 9:52 AM  Airway not difficult    General Information and Staff    Patient location during procedure: OR  Anesthesiologist: Ruiz Alejo MD  CRNA/CAA: Alexa Dorantes CRNA    Indications and Patient Condition  Indications for airway management: airway protection    Preoxygenated: yes  MILS not maintained throughout  Mask difficulty assessment: 2 - vent by mask + OA or adjuvant +/- NMBA    Final Airway Details  Final airway type: endotracheal airway      Successful airway: ETT  Cuffed: yes   Successful intubation technique: video laryngoscopy (Neck midline, extension avoided, bottom teeth avoided)  Facilitating devices/methods: intubating stylet  Endotracheal tube insertion site: oral  Blade: Magana  Blade size: 4  ETT size (mm): 7.5  Cormack-Lehane Classification: grade I - full view of glottis  Placement verified by: chest auscultation and capnometry   Measured from: lips  ETT/EBT  to lips (cm): 21  Number of attempts at approach: 1  Assessment: lips, teeth, and gum same as pre-op and atraumatic intubation    Additional Comments  Negative epigastric sounds, Breath sound equal bilaterally with symmetric chest rise and fall

## 2024-05-16 LAB — GLUCOSE BLDC GLUCOMTR-MCNC: 239 MG/DL (ref 70–130)

## 2024-05-16 PROCEDURE — A9270 NON-COVERED ITEM OR SERVICE: HCPCS | Performed by: PHYSICIAN ASSISTANT

## 2024-05-16 PROCEDURE — 63710000001 BUDESONIDE 3 MG CAPSULE DELAYED-RELEASE PARTICLES: Performed by: NEUROLOGICAL SURGERY

## 2024-05-16 PROCEDURE — 99024 POSTOP FOLLOW-UP VISIT: CPT | Performed by: NEUROLOGICAL SURGERY

## 2024-05-16 PROCEDURE — A9270 NON-COVERED ITEM OR SERVICE: HCPCS | Performed by: NEUROLOGICAL SURGERY

## 2024-05-16 PROCEDURE — 63710000001 AMIODARONE 200 MG TABLET: Performed by: NEUROLOGICAL SURGERY

## 2024-05-16 PROCEDURE — 97165 OT EVAL LOW COMPLEX 30 MIN: CPT

## 2024-05-16 PROCEDURE — 63710000001 HYDROCODONE-ACETAMINOPHEN 5-325 MG TABLET: Performed by: NEUROLOGICAL SURGERY

## 2024-05-16 PROCEDURE — 82948 REAGENT STRIP/BLOOD GLUCOSE: CPT

## 2024-05-16 PROCEDURE — 63710000001 IBUPROFEN 400 MG TABLET: Performed by: NEUROLOGICAL SURGERY

## 2024-05-16 PROCEDURE — 63710000001 SPIRONOLACTONE 25 MG TABLET: Performed by: NEUROLOGICAL SURGERY

## 2024-05-16 PROCEDURE — 97535 SELF CARE MNGMENT TRAINING: CPT

## 2024-05-16 PROCEDURE — 97161 PT EVAL LOW COMPLEX 20 MIN: CPT

## 2024-05-16 PROCEDURE — 97110 THERAPEUTIC EXERCISES: CPT

## 2024-05-16 PROCEDURE — 63710000001 BUDESONIDE 3 MG CAPSULE DELAYED-RELEASE PARTICLES: Performed by: PHYSICIAN ASSISTANT

## 2024-05-16 PROCEDURE — 63710000001 SENNOSIDES-DOCUSATE 8.6-50 MG TABLET: Performed by: NEUROLOGICAL SURGERY

## 2024-05-16 PROCEDURE — 63710000001 TEMAZEPAM 15 MG CAPSULE: Performed by: NEUROLOGICAL SURGERY

## 2024-05-16 PROCEDURE — 63710000001 ATORVASTATIN 40 MG TABLET: Performed by: NEUROLOGICAL SURGERY

## 2024-05-16 PROCEDURE — 97116 GAIT TRAINING THERAPY: CPT

## 2024-05-16 PROCEDURE — 63710000001 METOPROLOL SUCCINATE XL 25 MG TABLET SUSTAINED-RELEASE 24 HOUR: Performed by: NEUROLOGICAL SURGERY

## 2024-05-16 PROCEDURE — 63710000001 FUROSEMIDE 20 MG TABLET: Performed by: NEUROLOGICAL SURGERY

## 2024-05-16 PROCEDURE — 63710000001 LEVETIRACETAM 500 MG TABLET: Performed by: NEUROLOGICAL SURGERY

## 2024-05-16 RX ORDER — BUDESONIDE 3 MG/1
9 CAPSULE, COATED PELLETS ORAL DAILY
Status: DISCONTINUED | OUTPATIENT
Start: 2024-05-17 | End: 2024-05-18 | Stop reason: HOSPADM

## 2024-05-16 RX ORDER — BUDESONIDE 3 MG/1
3 CAPSULE, COATED PELLETS ORAL ONCE
Status: COMPLETED | OUTPATIENT
Start: 2024-05-16 | End: 2024-05-16

## 2024-05-16 RX ADMIN — HYDROCODONE BITARTRATE AND ACETAMINOPHEN 1 TABLET: 5; 325 TABLET ORAL at 02:49

## 2024-05-16 RX ADMIN — SENNOSIDES AND DOCUSATE SODIUM 2 TABLET: 8.6; 5 TABLET ORAL at 20:46

## 2024-05-16 RX ADMIN — METOPROLOL SUCCINATE 25 MG: 25 TABLET, EXTENDED RELEASE ORAL at 08:24

## 2024-05-16 RX ADMIN — LEVETIRACETAM 500 MG: 500 TABLET, FILM COATED ORAL at 08:24

## 2024-05-16 RX ADMIN — Medication 10 ML: at 20:47

## 2024-05-16 RX ADMIN — TEMAZEPAM 15 MG: 15 CAPSULE ORAL at 20:46

## 2024-05-16 RX ADMIN — IBUPROFEN 200 MG: 400 TABLET, FILM COATED ORAL at 02:49

## 2024-05-16 RX ADMIN — ATORVASTATIN CALCIUM 80 MG: 40 TABLET, FILM COATED ORAL at 20:46

## 2024-05-16 RX ADMIN — AMIODARONE HYDROCHLORIDE 200 MG: 200 TABLET ORAL at 08:24

## 2024-05-16 RX ADMIN — BUDESONIDE 6 MG: 3 CAPSULE, COATED PELLETS ORAL at 08:24

## 2024-05-16 RX ADMIN — FUROSEMIDE 20 MG: 20 TABLET ORAL at 08:24

## 2024-05-16 RX ADMIN — SENNOSIDES AND DOCUSATE SODIUM 2 TABLET: 8.6; 5 TABLET ORAL at 08:24

## 2024-05-16 RX ADMIN — BUDESONIDE 3 MG: 3 CAPSULE, COATED PELLETS ORAL at 16:00

## 2024-05-16 RX ADMIN — LEVETIRACETAM 500 MG: 500 TABLET, FILM COATED ORAL at 20:46

## 2024-05-16 RX ADMIN — HYDROCODONE BITARTRATE AND ACETAMINOPHEN 1 TABLET: 5; 325 TABLET ORAL at 20:46

## 2024-05-16 RX ADMIN — SPIRONOLACTONE 25 MG: 25 TABLET ORAL at 08:24

## 2024-05-16 RX ADMIN — HYDROCODONE BITARTRATE AND ACETAMINOPHEN 1 TABLET: 5; 325 TABLET ORAL at 14:53

## 2024-05-16 RX ADMIN — SPIRONOLACTONE 25 MG: 25 TABLET ORAL at 20:46

## 2024-05-16 RX ADMIN — Medication 10 ML: at 08:26

## 2024-05-16 RX ADMIN — HYDROCODONE BITARTRATE AND ACETAMINOPHEN 1 TABLET: 5; 325 TABLET ORAL at 08:24

## 2024-05-16 NOTE — THERAPY EVALUATION
Patient Name: Isaiah Le  : 1950    MRN: [id number]                              Today's Date: 2024       Admit Date: 5/15/2024    Visit Dx:     ICD-10-CM ICD-9-CM   1. Sciatica associated with disorder of lumbar spine  M53.86 724.3     Patient Active Problem List   Diagnosis    Neurogenic claudication    Sciatica associated with disorder of lumbar spine     Past Medical History:   Diagnosis Date    Atrial fibrillation     History of transfusion     Hyperlipidemia     Hypertension     Lumbosacral disc disease     Ulcerative colitis     Wears dentures     UPPER AND LOWER    Wears glasses     Wears hearing aid in both ears      Past Surgical History:   Procedure Laterality Date    CATARACT EXTRACTION Bilateral     COLONOSCOPY      WITH POLYPS REMOVED    LUMBAR LAMINECTOMY DISCECTOMY DECOMPRESSION N/A 5/15/2024    Procedure: Lumbar laminectomy L4-5 open, LEFT L5-S1 FORAMINOTOMY;  Surgeon: Abbe Nice MD;  Location: [facility];  Service: Neurosurgery;  Laterality: N/A;    REPLACEMENT TOTAL KNEE Right       General Information       Row Name 24 1120          OT Time and Intention    Document Type evaluation  -     Mode of Treatment occupational therapy  -       Row Name 24 1120          General Information    Patient Profile Reviewed yes  -LC     Prior Level of Function independent:;all household mobility;transfer;ADL's  used SPC at all times PTA  -LC     Existing Precautions/Restrictions fall;spinal;other (see comments)  SANA drain, [place]  -     Barriers to Rehab previous functional deficit;hearing deficit  -LC       Row Name 24 1120          Living Environment    People in Home other (see comments)  ex-wife present most of the time to assist  -LC       Row Name 24 1120          Home Main Entrance    Number of Stairs, Main Entrance one  -LC     Stair Railings, Main Entrance none  -LC       Row Name 24 1120          Stairs Within Home, Primary    Number of  Stairs, Within Home, Primary none  -       Row Name 05/16/24 1120          Cognition    Orientation Status (Cognition) oriented x 4  -       Row Name 05/16/24 1120          Safety Issues, Functional Mobility    Safety Issues Affecting Function (Mobility) safety precaution awareness;safety precautions follow-through/compliance;insight into deficits/self-awareness;positioning of assistive device;sequencing abilities  -     Impairments Affecting Function (Mobility) balance;endurance/activity tolerance;postural/trunk control;range of motion (ROM);strength;pain  -               User Key  (r) = Recorded By, (t) = Taken By, (c) = Cosigned By      Initials Name Provider Type     Rhona Mcdonald OT Occupational Therapist                     Mobility/ADL's       Row Name 05/16/24 1132          Bed Mobility    Comment, (Bed Mobility) UIC  -[facility] Name 05/16/24 1132          Transfers    Transfers sit-stand transfer  -     Comment, (Transfers) VC's for hand placement and to limit trunk flexion in standing. initial posterior lean, able to correct with cues.  -       Row Name 05/16/24 1132          Sit-Stand Transfer    Sit-Stand [place] (Transfers) minimum assist (75% patient effort);verbal cues  -     Assistive Device (Sit-Stand Transfers) walker, front-wheeled  -       Row Name 05/16/24 1132          Activities of Daily Living    BADL Assessment/Intervention lower body dressing;bathing;toileting  -[facility] Name 05/16/24 1132          Lower Body Dressing Assessment/Training    [place] Level (Lower Body Dressing) lower body dressing skills;moderate assist (50% patient effort)  -     Assistive Devices (Lower Body Dressing) long-handled shoe horn;reacher;sock-aid  -     Position (Lower Body Dressing) unsupported sitting  -     Comment, (Lower Body Dressing) Educated pt. on use of AE for LBD to facilitate maintaining spinal precautions. REcommend continued trials to ensure carry over.   -[facility] Name 05/16/24 1132          Bathing Assessment/Intervention    [place] Level (Bathing) lower body  -     Assistive Devices (Bathing) long-handled sponge  -     Comment, (Bathing) Educated pt. on use of LHS to facilitate maintaining spinal precautions during task. Simulated with good understanding.  -[facility] Name 05/16/24 1132          Toileting Assessment/Training    Assistive Devices (Toileting) toilet paper aid  -     Comment, (Toileting) Educated pt. on use of toileting aid to faciliate maintaining spinal precautions during hygiene. Verbalized understanding.  -               User Key  (r) = Recorded By, (t) = Taken By, (c) = Cosigned By      Initials Name Provider Type     Rhona Mcdonald OT Occupational Therapist                   Obj/Interventions       [facility] Name 05/16/24 1141          Sensory Assessment (Somatosensory)    Sensory Assessment (Somatosensory) UE sensation intact  -LC       Row Name 05/16/24 1141          Range of Motion Comprehensive    General Range of Motion bilateral upper extremity ROM WFL  -LC       Row Name 05/16/24 1141          Strength Comprehensive (MMT)    General Manual Muscle Testing (MMT) Assessment upper extremity strength deficits identified  -LC       Row Name 05/16/24 1141          Upper Extremity (Manual Muscle Testing)    Upper Extremity: Manual Muscle Testing (MMT) left UE strength is WFL;right UE strength is WFL  -[facility] Name 05/16/24 1141          Balance    Balance Assessment sitting static balance;sitting dynamic balance;standing static balance  -     Static Sitting Balance standby assist  -     Dynamic Sitting Balance contact guard  -     Position, Sitting Balance unsupported;sitting in chair  -     Static Standing Balance verbal cues;minimal assist  -     Balance Interventions sitting;standing;sit to stand;supported;occupation based/functional task;weight shifting activity  -     Comment, Balance Initial posterior lean  in standing, able to correct with cues.  -               User Key  (r) = Recorded By, (t) = Taken By, (c) = Cosigned By      Initials Name Provider Type    Rhona Mar OT Occupational Therapist                   Goals/Plan       Row Name 05/16/24 1140          Transfer Goal 1 (OT)    Activity/Assistive Device (Transfer Goal 1, OT) sit-to-stand/stand-to-sit;bed-to-chair/chair-to-bed;walker, rolling  -     Time Frame (Transfer Goal 1, OT) long term goal (LTG);by discharge  -     Progress/Outcome (Transfer Goal 1, OT) goal ongoing  -       Row Name 05/16/24 1144          Dressing Goal 1 (OT)    Activity/Device (Dressing Goal 1, OT) lower body dressing;long-handled shoe horn;reacher;sock-aid  -     [place]/Cues Needed (Dressing Goal 1, OT) contact guard required;verbal cues required  -     Time Frame (Dressing Goal 1, OT) long term goal (LTG);by discharge  -     Progress/Outcome (Dressing Goal 1, OT) goal ongoing  -       Row Name 05/16/24 7642          Toileting Goal 1 (OT)    Activity/Device (Toileting Goal 1, OT) adjust/manage clothing;perform perineal hygiene;commode;grab bar/safety frame  -     [place] Level/Cues Needed (Toileting Goal 1, OT) minimum assist (75% or more patient effort);verbal cues required  -     Time Frame (Toileting Goal 1, OT) long term goal (LTG);by discharge  -     Progress/Outcome (Toileting Goal 1, OT) goal ongoing  -               User Key  (r) = Recorded By, (t) = Taken By, (c) = Cosigned By      Initials Name Provider Type    Rhona Mar OT Occupational Therapist                   Clinical Impression       Row Name 05/16/24 0785          Pain Assessment    Pretreatment Pain Rating 0/10 - no pain  -     Posttreatment Pain Rating 0/10 - no pain  -     Additional Documentation Pain Scale: Word Pre/Post-Treatment (Group)  -[facility] Name 05/16/24 114          Plan of Care Review    Plan of Care Reviewed With patient;daughter  -      Progress improving  -     Outcome Evaluation Pt. educated on spinal precautions during ADLs and functional mobility. Reviewed AE to facilitate maintaining spinal precautions. Recommend continued review to ensure carry over. Recommend home with assist at discharge.  -       Row Name 05/16/24 1146          Therapy Assessment/Plan (OT)    Patient/Family Therapy Goal Statement (OT) To return to PLOF  -     Therapy Frequency (OT) daily  -       Row Name 05/16/24 1146          Therapy Plan Review/Discharge Plan (OT)    Anticipated Discharge Disposition (OT) home with assist  -       Row Name 05/16/24 1146          Positioning and Restraints    Pre-Treatment Position sitting in chair/recliner  -     Post Treatment Position chair  -LC     In Chair notified nsg;reclined;call light within reach;encouraged to call for assist;exit alarm on;with family/caregiver;waffle cushion;legs elevated;compression device  -               User Key  (r) = Recorded By, (t) = Taken By, (c) = Cosigned By      Initials Name Provider Type     Rhona Mcdonald, OT Occupational Therapist                   Outcome Measures       Row Name 05/16/24 1151          How much help from another is currently needed...    Putting on and taking off regular lower body clothing? 2  -LC     Bathing (including washing, rinsing, and drying) 2  -LC     Toileting (which includes using toilet bed pan or urinal) 2  -LC     Putting on and taking off regular upper body clothing 4  -LC     Taking care of personal grooming (such as brushing teeth) 4  -LC     Eating meals 4  -LC     AM-PAC 6 Clicks Score (OT) 18  -       Row Name 05/16/24 0834 05/16/24 0824       How much help from another person do you currently need...    Turning from your back to your side while in flat bed without using bedrails? 3  -LR 3  -GS    Moving from lying on back to sitting on the side of a flat bed without bedrails? 3  -LR 3  -GS    Moving to and from a bed to a chair (including  a wheelchair)? 3  -LR 3  -GS    Standing up from a chair using your arms (e.g., wheelchair, bedside chair)? 3  -LR 3  -GS    Climbing 3-5 steps with a railing? 3  -LR 3  -GS    To walk in hospital room? 3  -LR 3  -GS    AM-PAC 6 Clicks Score (PT) 18  -LR 18  -GS    Highest Level of Mobility Goal 6 --> Walk 10 steps or more  -LR 6 --> Walk 10 steps or more  -GS      Row Name 05/16/24 1151 05/16/24 0834       Functional Assessment    Outcome Measure Options AM-PAC 6 Clicks Daily Activity (OT)  - AM-PAC 6 Clicks Basic Mobility (PT)  -LR              User Key  (r) = Recorded By, (t) = Taken By, (c) = Cosigned By      Initials Name Provider Type    Rhona Bolaños, PT Physical Therapist    Dana Batista RN Registered Nurse    Rhona Mar, OT Occupational Therapist                    Occupational Therapy Education       Title: PT OT SLP Therapies (In Progress)       Topic: Occupational Therapy (In Progress)       Point: ADL training (In Progress)       Description:   Instruct learner(s) on proper safety adaptation and remediation techniques during self care or transfers.   Instruct in proper use of assistive devices.                  Learning Progress Summary             Patient Acceptance, E, NR by  at 5/16/2024 1016                         Point: Home exercise program (Not Started)       Description:   Instruct learner(s) on appropriate technique for monitoring, assisting and/or progressing therapeutic exercises/activities.                  Learner Progress:  Not documented in this visit.              Point: Precautions (In Progress)       Description:   Instruct learner(s) on prescribed precautions during self-care and functional transfers.                  Learning Progress Summary             Patient Acceptance, E, NR by  at 5/16/2024 1016                         Point: Body mechanics (In Progress)       Description:   Instruct learner(s) on proper positioning and spine alignment during  self-care, functional mobility activities and/or exercises.                  Learning Progress Summary             Patient Acceptance, E, NR by  at 5/16/2024 1016                                         User Key       Initials Effective Dates Name Provider Type Discipline     06/16/21 -  Rhona Mcdonald OT Occupational Therapist OT                  OT Recommendation and Plan  Therapy Frequency (OT): daily  Plan of Care Review  Plan of Care Reviewed With: patient, daughter  Progress: improving  Outcome Evaluation: Pt. educated on spinal precautions during ADLs and functional mobility. Reviewed AE to facilitate maintaining spinal precautions. Recommend continued review to ensure carry over. Recommend home with assist at discharge.     Time Calculation:   Evaluation Complexity (OT)  Review Occupational Profile/Medical/Therapy History Complexity: brief/low complexity  Assessment, Occupational Performance/Identification of Deficit Complexity: 1-3 performance deficits  Clinical Decision Making Complexity (OT): problem focused assessment/low complexity  Overall Complexity of Evaluation (OT): low complexity     Time Calculation- OT       Row Name 05/16/24 1153 05/16/24 0834          Time Calculation- OT    OT Start Time 1016  - --     OT Received On 05/16/24  - --     OT Goal Re-Cert Due Date 05/26/24  - --        Timed Charges    63286 - Gait Training Minutes  -- 15  -LR     37581 - OT Self Care/Mgmt Minutes 13  -LC --        Untimed Charges    OT Eval/Re-eval Minutes 50  -LC --        Total Minutes    Timed Charges Total Minutes 13  -LC 15  -LR     Untimed Charges Total Minutes 50  -LC --      Total Minutes 63  -LC 15  -LR               User Key  (r) = Recorded By, (t) = Taken By, (c) = Cosigned By      Initials Name Provider Type    Rhona Bolaños PT Physical Therapist    Rhona Mar OT Occupational Therapist                  Therapy Charges for Today       Code Description Service Date  Service Provider Modifiers Qty    39690307642 HC OT SELF CARE/MGMT/TRAIN EA 15 MIN 5/16/2024 Rhona Mcdonald, OT GO 1    [id number] HC OT EVAL LOW COMPLEXITY 4 5/16/2024 Rhona Mcdonald OT GO 1                 Rhona Mcdonald OT  5/16/2024

## 2024-05-16 NOTE — PLAN OF CARE
Problem: Adult Inpatient Plan of Care  Goal: Plan of Care Review  Outcome: Ongoing, Progressing  Flowsheets  Taken 5/16/2024 1713 by Dana Franco RN  Progress: improving  Taken 5/16/2024 1146 by Rhona Mcdonald OT  Plan of Care Reviewed With:   patient   daughter  Goal: Patient-Specific Goal (Individualized)  Outcome: Ongoing, Progressing  Goal: Absence of Hospital-Acquired Illness or Injury  Outcome: Ongoing, Progressing  Intervention: Identify and Manage Fall Risk  Recent Flowsheet Documentation  Taken 5/16/2024 1600 by Dana Franco RN  Safety Promotion/Fall Prevention:   activity supervised   assistive device/personal items within reach   clutter free environment maintained   room organization consistent   safety round/check completed   toileting scheduled  Taken 5/16/2024 1400 by Dana Franco RN  Safety Promotion/Fall Prevention:   activity supervised   assistive device/personal items within reach   clutter free environment maintained   room organization consistent   safety round/check completed   toileting scheduled  Taken 5/16/2024 1200 by Dana Franco RN  Safety Promotion/Fall Prevention:   activity supervised   assistive device/personal items within reach   clutter free environment maintained   room organization consistent   safety round/check completed   toileting scheduled  Taken 5/16/2024 1000 by Dana Franco RN  Safety Promotion/Fall Prevention:   activity supervised   assistive device/personal items within reach   clutter free environment maintained   room organization consistent   safety round/check completed   toileting scheduled  Taken 5/16/2024 0824 by Dana Franco RN  Safety Promotion/Fall Prevention:   activity supervised   assistive device/personal items within reach   clutter free environment maintained   room organization consistent   safety round/check completed   toileting scheduled  Intervention: Prevent Skin Injury  Recent Flowsheet Documentation  Taken 5/16/2024 1600 by  Dana Franco RN  Body Position: sitting up in bed  Taken 5/16/2024 1400 by Dana Franco RN  Body Position: legs elevated  Taken 5/16/2024 1200 by Dana Franco RN  Body Position: legs elevated  Taken 5/16/2024 1000 by Dana Franco RN  Body Position: position changed independently  Taken 5/16/2024 0824 by Dana Franco RN  Body Position: supine  Intervention: Prevent and Manage VTE (Venous Thromboembolism) Risk  Recent Flowsheet Documentation  Taken 5/16/2024 1600 by Dana Franco RN  Activity Management: activity encouraged  VTE Prevention/Management:   bilateral   sequential compression devices on  Taken 5/16/2024 1400 by Dana Franco RN  Activity Management: up in chair  VTE Prevention/Management:   bilateral   sequential compression devices on  Taken 5/16/2024 1200 by Dana Franco RN  Activity Management: up in chair  VTE Prevention/Management:   bilateral   sequential compression devices on  Taken 5/16/2024 1000 by Dana Franco RN  Activity Management: activity encouraged  VTE Prevention/Management:   bilateral   sequential compression devices on  Taken 5/16/2024 0824 by Dana Franco RN  Activity Management: activity encouraged  VTE Prevention/Management:   bilateral   sequential compression devices on  Intervention: Prevent Infection  Recent Flowsheet Documentation  Taken 5/16/2024 1600 by Dana Franco RN  Infection Prevention:   environmental surveillance performed   rest/sleep promoted  Taken 5/16/2024 1400 by Dana Franco RN  Infection Prevention:   environmental surveillance performed   rest/sleep promoted  Taken 5/16/2024 1200 by Dana Franco RN  Infection Prevention:   environmental surveillance performed   rest/sleep promoted  Taken 5/16/2024 1000 by Dana Franco RN  Infection Prevention:   environmental surveillance performed   rest/sleep promoted  Taken 5/16/2024 0824 by Dana Franco RN  Infection Prevention:   environmental surveillance performed   rest/sleep  promoted  Goal: Optimal Comfort and Wellbeing  Outcome: Ongoing, Progressing  Intervention: Monitor Pain and Promote Comfort  Recent Flowsheet Documentation  Taken 5/16/2024 0824 by Dana Franco RN  Pain Management Interventions: see MAR  Intervention: Provide Person-Centered Care  Recent Flowsheet Documentation  Taken 5/16/2024 0824 by Dana Franco RN  Trust Relationship/Rapport: care explained  Goal: Readiness for Transition of Care  Outcome: Ongoing, Progressing     Problem: Hypertension Comorbidity  Goal: Blood Pressure in Desired Range  Outcome: Ongoing, Progressing  Intervention: Maintain Blood Pressure Management  Recent Flowsheet Documentation  Taken 5/16/2024 1600 by Dana Franco RN  Medication Review/Management: medications reviewed  Taken 5/16/2024 1400 by Dana Franco RN  Medication Review/Management: medications reviewed  Taken 5/16/2024 1200 by Dana Franco RN  Medication Review/Management: medications reviewed  Taken 5/16/2024 1000 by Dana Franco RN  Medication Review/Management: medications reviewed  Taken 5/16/2024 0824 by Dana Franco RN  Medication Review/Management: medications reviewed     Problem: Fall Injury Risk  Goal: Absence of Fall and Fall-Related Injury  Outcome: Ongoing, Progressing  Intervention: Identify and Manage Contributors  Recent Flowsheet Documentation  Taken 5/16/2024 1600 by Dana Franco RN  Medication Review/Management: medications reviewed  Taken 5/16/2024 1400 by Dana Franco RN  Medication Review/Management: medications reviewed  Taken 5/16/2024 1200 by Dana Franco RN  Medication Review/Management: medications reviewed  Taken 5/16/2024 1000 by Dana Franco RN  Medication Review/Management: medications reviewed  Taken 5/16/2024 0824 by Dana Franco RN  Medication Review/Management: medications reviewed  Intervention: Promote Injury-Free Environment  Recent Flowsheet Documentation  Taken 5/16/2024 1600 by Dana Franco RN  Safety  Promotion/Fall Prevention:   activity supervised   assistive device/personal items within reach   clutter free environment maintained   room organization consistent   safety round/check completed   toileting scheduled  Taken 5/16/2024 1400 by Dana Franco RN  Safety Promotion/Fall Prevention:   activity supervised   assistive device/personal items within reach   clutter free environment maintained   room organization consistent   safety round/check completed   toileting scheduled  Taken 5/16/2024 1200 by Dana Franco RN  Safety Promotion/Fall Prevention:   activity supervised   assistive device/personal items within reach   clutter free environment maintained   room organization consistent   safety round/check completed   toileting scheduled  Taken 5/16/2024 1000 by Dana Franco RN  Safety Promotion/Fall Prevention:   activity supervised   assistive device/personal items within reach   clutter free environment maintained   room organization consistent   safety round/check completed   toileting scheduled  Taken 5/16/2024 0824 by Dana Franco RN  Safety Promotion/Fall Prevention:   activity supervised   assistive device/personal items within reach   clutter free environment maintained   room organization consistent   safety round/check completed   toileting scheduled     Problem: Bleeding (Spinal Surgery)  Goal: Absence of Bleeding  Outcome: Ongoing, Progressing     Problem: Bowel Motility Impaired (Spinal Surgery)  Goal: Effective Bowel Elimination  Outcome: Ongoing, Progressing     Problem: Fluid and Electrolyte Imbalance (Spinal Surgery)  Goal: Fluid and Electrolyte Balance  Outcome: Ongoing, Progressing     Problem: Functional Ability Impaired (Spinal Surgery)  Goal: Optimal Functional Ability  Outcome: Ongoing, Progressing  Intervention: Optimize Functional Status  Recent Flowsheet Documentation  Taken 5/16/2024 1600 by Dana Franco RN  Activity Management: activity encouraged  Positioning/Transfer  Devices:   pillows   in use  Taken 5/16/2024 1400 by Dana Franco RN  Activity Management: up in chair  Positioning/Transfer Devices:   pillows   in use  Taken 5/16/2024 1200 by Dana Franco RN  Activity Management: up in chair  Positioning/Transfer Devices:   pillows   in use  Taken 5/16/2024 1000 by Dana Franco RN  Activity Management: activity encouraged  Positioning/Transfer Devices:   pillows   in use  Taken 5/16/2024 0824 by Dana Franco RN  Activity Management: activity encouraged  Positioning/Transfer Devices:   pillows   in use     Problem: Infection (Spinal Surgery)  Goal: Absence of Infection Signs and Symptoms  Outcome: Ongoing, Progressing  Intervention: Prevent or Manage Infection  Recent Flowsheet Documentation  Taken 5/16/2024 1600 by Dana Franco RN  Infection Prevention:   environmental surveillance performed   rest/sleep promoted  Taken 5/16/2024 1400 by Dana Franco RN  Infection Prevention:   environmental surveillance performed   rest/sleep promoted  Taken 5/16/2024 1200 by Dana Franco RN  Infection Prevention:   environmental surveillance performed   rest/sleep promoted  Taken 5/16/2024 1000 by Dana Franco RN  Infection Prevention:   environmental surveillance performed   rest/sleep promoted  Taken 5/16/2024 0824 by Dana Franco RN  Infection Prevention:   environmental surveillance performed   rest/sleep promoted     Problem: Neurologic Impairment (Spinal Surgery)  Goal: Optimal Neurologic Function  Outcome: Ongoing, Progressing  Intervention: Optimize Neurologic Function  Recent Flowsheet Documentation  Taken 5/16/2024 1600 by Dana Franco RN  Body Position: sitting up in bed  Taken 5/16/2024 1400 by Dana Franco RN  Body Position: legs elevated  Taken 5/16/2024 1200 by Dana Franco RN  Body Position: legs elevated  Taken 5/16/2024 1000 by Dana Franco RN  Body Position: position changed independently  Taken 5/16/2024 0824 by Dana Franco RN  Body  Position: supine     Problem: Ongoing Anesthesia Effects (Spinal Surgery)  Goal: Anesthesia/Sedation Recovery  Outcome: Ongoing, Progressing  Intervention: Optimize Anesthesia Recovery  Recent Flowsheet Documentation  Taken 5/16/2024 1600 by Dana Franco RN  Safety Promotion/Fall Prevention:   activity supervised   assistive device/personal items within reach   clutter free environment maintained   room organization consistent   safety round/check completed   toileting scheduled  Administration (IS): self-administered  Taken 5/16/2024 1400 by Dana Franco RN  Safety Promotion/Fall Prevention:   activity supervised   assistive device/personal items within reach   clutter free environment maintained   room organization consistent   safety round/check completed   toileting scheduled  Taken 5/16/2024 1200 by Dana Franco RN  Safety Promotion/Fall Prevention:   activity supervised   assistive device/personal items within reach   clutter free environment maintained   room organization consistent   safety round/check completed   toileting scheduled  Administration (IS): self-administered  Taken 5/16/2024 1000 by Dana Franco RN  Safety Promotion/Fall Prevention:   activity supervised   assistive device/personal items within reach   clutter free environment maintained   room organization consistent   safety round/check completed   toileting scheduled  Administration (IS): self-administered  Taken 5/16/2024 0824 by Dana Franco RN  Safety Promotion/Fall Prevention:   activity supervised   assistive device/personal items within reach   clutter free environment maintained   room organization consistent   safety round/check completed   toileting scheduled  Administration (IS): self-administered     Problem: Pain (Spinal Surgery)  Goal: Acceptable Pain Control  Outcome: Ongoing, Progressing  Intervention: Prevent or Manage Pain  Recent Flowsheet Documentation  Taken 5/16/2024 0824 by Dana Franco RN  Pain  Management Interventions: see MAR     Problem: Postoperative Nausea and Vomiting (Spinal Surgery)  Goal: Nausea and Vomiting Relief  Outcome: Ongoing, Progressing     Problem: Postoperative Urinary Retention (Spinal Surgery)  Goal: Effective Urinary Elimination  Outcome: Ongoing, Progressing     Problem: Respiratory Compromise (Spinal Surgery)  Goal: Effective Oxygenation and Ventilation  Outcome: Ongoing, Progressing  Intervention: Optimize Oxygenation and Ventilation  Recent Flowsheet Documentation  Taken 5/16/2024 1600 by Dana Franco RN  Head of Bed (HOB) Positioning: HOB at 30-45 degrees  Taken 5/16/2024 1000 by Dana Franco RN  Head of Bed (HOB) Positioning: HOB at 20-30 degrees  Taken 5/16/2024 0824 by Dana Franco RN  Head of Bed (HOB) Positioning: HOB at 20-30 degrees   Goal Outcome Evaluation:           Progress: improving          Pt alert and oriented x4. VSS. RA. Minimal complaints of pain controlled with PRN pain medication. Moderate drainage present on dressing, marked this AM. No progression of drainage present on dressing as of 1700. SANA drained 105ml as of 1700. No complaints of headache this shift. PT worked with physical therapy and OT. Ambulating well with walker and gait x1 assist. Possible d/c home tomorrow

## 2024-05-16 NOTE — CASE MANAGEMENT/SOCIAL WORK
"Discharge Planning Assessment  [facility]     Patient Name: Isaiah Le  MRN: [id number]  Today's Date: 5/16/2024    Admit Date: 5/15/2024    Plan: Home with family assistance and a rolling walker from [facility]     Discharge Plan       Row Name 05/16/24 1515       Plan    Plan Home with family assistance and a rolling walker from [facility]    Patient/Family in Agreement with Plan yes    Plan Comments Met with Mr. Le, at the bedside, for discharge planning.    Mr. Le lives alone in [facility].    He has been evaluated by PT and per notes, \"Patient ambulated 200 feet with RW, CGA, step through gait pattern, limited by fatigue and weakness. Patient demonstrated moderate balance deficits upon standing up, leaning posteriorly, requiring assist and cues for correction. Patient reports he has balance deficits at baseline. Patient currently below baseline functioning, demonstrating decreased functional mobility status, impaired balance, decreased endurance, and decreased strength.  Recommend d/c home with assist.\"    The patient has a st cane at home.  He requested a rolling walker for home use and did not have preference for provider.  Contacted Sarah and they will deliver the walker to the hospital room today.  No other DME needs noted.    PCP is Boy Williamson.  Insurance is [employer].  No ACP documents.    DC plan is to return home with his ex- wife's assistance, Debora.  Debora to transport the patient home when discharged.    CM will continue to follow.    Final Discharge Disposition Code 01 - home or self-care                  Continued Care and Services - Admitted Since 5/15/2024       Durable Medical Equipment       Service Provider Request Status Selected Services Address Phone Fax Patient Preferred    [facility] - [place] Pending - No Request Sent N/A [address] [address] [phone] --                    Stacie Ahumada RN    "

## 2024-05-16 NOTE — PLAN OF CARE
Goal Outcome Evaluation:  Plan of Care Reviewed With: patient, daughter        Progress: improving  Outcome Evaluation: Patient ambulated 200 feet with RW, CGA, step through gait pattern, limited by fatigue and weakness. Patient demonstrated moderate balance deficits upon standing up, leaning posteriorly, requiring assist and cues for correction. Patient reports he has balance deficits at baseline. Patient currently below baseline functioning, demonstrating decreased functional mobility status, impaired balance, decreased endurance, and decreased strength. Will address these deficits to promote return to PLOF. Recommend d/c home with assist. Needs RW for home. Recommend OT consult for ADL and adaptive equipment training.      Anticipated Discharge Disposition (PT): home with assist

## 2024-05-16 NOTE — THERAPY EVALUATION
Patient Name: Isaiah Le  : 1950    MRN: [id number]                              Today's Date: 2024       Admit Date: 5/15/2024    Visit Dx:     ICD-10-CM ICD-9-CM   1. Sciatica associated with disorder of lumbar spine  M53.86 724.3     Patient Active Problem List   Diagnosis    Neurogenic claudication    Sciatica associated with disorder of lumbar spine     Past Medical History:   Diagnosis Date    Atrial fibrillation     History of transfusion     Hyperlipidemia     Hypertension     Lumbosacral disc disease     Ulcerative colitis     Wears dentures     UPPER AND LOWER    Wears glasses     Wears hearing aid in both ears      Past Surgical History:   Procedure Laterality Date    CATARACT EXTRACTION Bilateral     COLONOSCOPY      WITH POLYPS REMOVED    LUMBAR LAMINECTOMY DISCECTOMY DECOMPRESSION N/A 5/15/2024    Procedure: Lumbar laminectomy L4-5 open, LEFT L5-S1 FORAMINOTOMY;  Surgeon: Abbe Nice MD;  Location: [facility];  Service: Neurosurgery;  Laterality: N/A;    REPLACEMENT TOTAL KNEE Right       General Information       Row Name 24 0834          Physical Therapy Time and Intention    Document Type evaluation  -LR     Mode of Treatment physical therapy;individual therapy  -LR       Row Name 24 34          General Information    Patient Profile Reviewed yes  -LR     Prior Level of Function independent:;all household mobility;community mobility;gait;transfer;bed mobility  used SPC at all times PTA  -LR     Existing Precautions/Restrictions fall;spinal;other (see comments)  SANA drain, [place]  -LR     Barriers to Rehab previous functional deficit;hearing deficit  -LR       Row Name 24 0834          Living Environment    People in Home other (see comments)  ex-wife present most of the time to assist  -LR       Row Name 24 34          Home Main Entrance    Number of Stairs, Main Entrance one  -LR     Stair Railings, Main Entrance none  -LR       Row Name  05/16/24 0834          Stairs Within Home, Primary    Number of Stairs, Within Home, Primary none  -LR       Row Name 05/16/24 0834          Cognition    Orientation Status (Cognition) oriented x 4  -LR       Row Name 05/16/24 0834          Safety Issues, Functional Mobility    Safety Issues Affecting Function (Mobility) safety precautions follow-through/compliance;safety precaution awareness;positioning of assistive device;awareness of need for assistance;insight into deficits/self-awareness;judgment  -LR     Impairments Affecting Function (Mobility) balance;strength;pain;endurance/activity tolerance;postural/trunk control;range of motion (ROM)  -LR               User Key  (r) = Recorded By, (t) = Taken By, (c) = Cosigned By      Initials Name Provider Type    LR Rhona Noonan, PT Physical Therapist                   Mobility       Row Name 05/16/24 0834          Bed Mobility    Bed Mobility supine-sit  -LR     Supine-Sit [place] (Bed Mobility) verbal cues;minimum assist (75% patient effort)  -LR     Assistive Device (Bed Mobility) head of bed elevated;bed rails  -LR     Comment, (Bed Mobility) Verbal cues to flex knees and to roll hips and shoulders at same time onto L side. Cues to bring LEs off EOB and to push up from bed to raise trunk into sitting. Min assist required to bring LEs off EOB and to push up from bed to raise trunk into sitting. Min assist required to raise trunk into sitting. Denied dizziness or headache upon sitting up.  -LR       Row Name 05/16/24 0834          Transfers    Comment, (Transfers) Verbal cues to push up from bed to stand and to reach back for chair to lower into sitting. Verbal cues to limit trunk flexion during t/f and to scoot hips towards EOB to stand. Patient with posterior lean upon standing, bracing back of his legs against bed, toes up off of floor d/t posterior lean. Required repeated cues and min assist to correct, cues to shift hips forward and shoulders  back.  -LR       Row Name 05/16/24 0834          Bed-Chair Transfer    Bed-Chair [place] (Transfers) not tested  -LR       Row Name 05/16/24 0834          Sit-Stand Transfer    Sit-Stand [place] (Transfers) verbal cues;minimum assist (75% patient effort)  -LR     Assistive Device (Sit-Stand Transfers) walker, front-wheeled  -LR       Row Name 05/16/24 0834          Gait/Stairs (Locomotion)    [place] Level (Gait) verbal cues;contact guard  -LR     Assistive Device (Gait) walker, front-wheeled  -LR     Patient was able to Ambulate yes  -LR     Distance in Feet (Gait) 200  -LR     Deviations/Abnormal Patterns (Gait) bilateral deviations;christy decreased;gait speed decreased;stride length decreased  -LR     Bilateral Gait Deviations heel strike decreased;forward flexed posture  -LR     [place] Level (Stairs) not tested  -LR     Comment, (Gait/Stairs) Patient ambulated with step through gait pattern at slow pace. Verbal cues for upright posture, to stay within RW, increased LE weight bearing, and decreased UE weight bearing. Improved with cues for correction. Good foot clearance noted bilaterally. Gait limited by fatigue and weakness. Denied R hip/knee pain with mobility.  -LR               User Key  (r) = Recorded By, (t) = Taken By, (c) = Cosigned By      Initials Name Provider Type    LR Rhona Noonan, PT Physical Therapist                   Obj/Interventions       Row Name 05/16/24 0834          Range of Motion Comprehensive    General Range of Motion bilateral lower extremity ROM WFL  -LR       Row Name 05/16/24 0834          Strength Comprehensive (MMT)    General Manual Muscle Testing (MMT) Assessment lower extremity strength deficits identified  -LR       Row Name 05/16/24 0834          Motor Skills    Therapeutic Exercise ankle;knee;hip;other (see comments)  cues for technique; no assist required  -LR       Row Name 05/16/24 0834          Hip (Therapeutic Exercise)    Hip  (Therapeutic Exercise) AROM (active range of motion);isometric exercises  -LR     Hip AROM (Therapeutic Exercise) bilateral;external rotation;internal rotation;sitting;10 repetitions  -LR     Hip Isometrics (Therapeutic Exercise) bilateral;sitting;gluteal sets;10 repetitions  -       Row Name 05/16/24 0834          Knee (Therapeutic Exercise)    Knee (Therapeutic Exercise) strengthening exercise;isometric exercises  -LR     Knee Isometrics (Therapeutic Exercise) bilateral;quad sets;sitting;10 repetitions  -LR     Knee Strengthening (Therapeutic Exercise) bilateral;heel slides;sitting;10 repetitions  -LR       Row Name 05/16/24 0834          Ankle (Therapeutic Exercise)    Ankle (Therapeutic Exercise) AROM (active range of motion)  -LR     Ankle AROM (Therapeutic Exercise) bilateral;dorsiflexion;plantarflexion;sitting;10 repetitions  -       Row Name 05/16/24 0834          Balance    Balance Assessment sitting static balance;sitting dynamic balance;standing static balance;standing dynamic balance  -LR     Static Sitting Balance standby assist  -LR     Dynamic Sitting Balance standby assist  -LR     Position, Sitting Balance unsupported;sitting edge of bed  -LR     Static Standing Balance contact guard  -LR     Dynamic Standing Balance contact guard  -LR     Position/Device Used, Standing Balance walker, rolling  -       Row Name 05/16/24 0834          Sensory Assessment (Somatosensory)    Sensory Assessment (Somatosensory) LE sensation intact;other (see comments)  denies numbness/tingling; reports light touch equal and intact  -       Row Name 05/16/24 0834          Lower Extremity (Manual Muscle Testing)    Comment, MMT: Lower Extremity L LE functionally 4+/5, R LE functionally 4/5  -LR               User Key  (r) = Recorded By, (t) = Taken By, (c) = Cosigned By      Initials Name Provider Type    LR Rhona Noonan, PT Physical Therapist                   Goals/Plan       Row Name 05/16/24 0834           Bed Mobility Goal 1 (PT)    [place] Level/Cues Needed (Bed Mobility Goal 1, PT) modified independence  -LR     Time Frame (Bed Mobility Goal 1, PT) long term goal (LTG);5 days  -LR     Progress/Outcomes (Bed Mobility Goal 1, PT) goal ongoing  -LR       Row Name 05/16/24 0834          Transfer Goal 1 (PT)    Activity/Assistive Device (Transfer Goal 1, PT) sit-to-stand/stand-to-sit;walker, rolling  -LR     [place] Level/Cues Needed (Transfer Goal 1, PT) modified independence  -LR     Time Frame (Transfer Goal 1, PT) long term goal (LTG);5 days  -LR     Progress/Outcome (Transfer Goal 1, PT) goal ongoing  -LR       Row Name 05/16/24 0834          Gait Training Goal 1 (PT)    Activity/Assistive Device (Gait Training Goal 1, PT) gait (walking locomotion);walker, rolling  -LR     [place] Level (Gait Training Goal 1, PT) modified independence  -LR     Distance (Gait Training Goal 1, PT) 500 feet  -LR     Time Frame (Gait Training Goal 1, PT) long term goal (LTG);5 days  -LR     Progress/Outcome (Gait Training Goal 1, PT) goal ongoing  -LR       Row Name 05/16/24 0834          Stairs Goal 1 (PT)    Activity/Assistive Device (Stairs Goal 1, PT) ascending stairs;descending stairs;step-to-step;walker, rolling  -LR     [place] Level/Cues Needed (Stairs Goal 1, PT) contact guard required  -LR     Number of Stairs (Stairs Goal 1, PT) 1  -LR     Time Frame (Stairs Goal 1, PT) long term goal (LTG);5 days  -LR     Progress/Outcome (Stairs Goal 1, PT) goal ongoing  -LR       Row Name 05/16/24 0834          Therapy Assessment/Plan (PT)    Planned Therapy Interventions (PT) balance training;bed mobility training;gait training;home exercise program;patient/family education;ROM (range of motion);strengthening;transfer training;stair training  -LR               User Key  (r) = Recorded By, (t) = Taken By, (c) = Cosigned By      Initials Name Provider Type    LR Rhona Noonan, PT Physical Therapist                    Clinical Impression       Row Name 05/16/24 0834          Pain    Pretreatment Pain Rating 2/10  -LR     Posttreatment Pain Rating 0/10 - no pain  -LR     Pain Location - Side/Orientation Bilateral  -LR     Pain Location lower  -LR     Pain Location - back  -LR     Pain Intervention(s) Ambulation/increased activity;Repositioned;Cold applied  -LR       Row Name 05/16/24 0834          Plan of Care Review    Plan of Care Reviewed With patient;daughter  -LR     Progress improving  -LR     Outcome Evaluation Patient ambulated 200 feet with RW, CGA, step through gait pattern, limited by fatigue and weakness. Patient demonstrated moderate balance deficits upon standing up, leaning posteriorly, requiring assist and cues for correction. Patient reports he has balance deficits at baseline. Patient currently below baseline functioning, demonstrating decreased functional mobility status, impaired balance, decreased endurance, and decreased strength. Will address these deficits to promote return to PLOF. Recommend d/c home with assist. Needs RW for home. Recommend OT consult for ADL and adaptive equipment training.  -LR       Row Name 05/16/24 0834          Therapy Assessment/Plan (PT)    Patient/Family Therapy Goals Statement (PT) go home  -LR     Rehab Potential (PT) good, to achieve stated therapy goals  -LR     Criteria for Skilled Interventions Met (PT) yes;meets criteria;skilled treatment is necessary  -LR     Therapy Frequency (PT) daily  -LR       Row Name 05/16/24 0834          Positioning and Restraints    Pre-Treatment Position in bed  -LR     Post Treatment Position chair  RN cleared patient to sit UI  -LR     In Chair notified nsg;reclined;sitting;call light within reach;encouraged to call for assist;exit alarm on;with family/caregiver;compression device;legs elevated;waffle cushion  -LR               User Key  (r) = Recorded By, (t) = Taken By, (c) = Cosigned By      Initials Name Provider Type    LR  Rhona Noonan, PT Physical Therapist                   Outcome Measures       Row Name 05/16/24 0834          How much help from another person do you currently need...    Turning from your back to your side while in flat bed without using bedrails? 3  -LR     Moving from lying on back to sitting on the side of a flat bed without bedrails? 3  -LR     Moving to and from a bed to a chair (including a wheelchair)? 3  -LR     Standing up from a chair using your arms (e.g., wheelchair, bedside chair)? 3  -LR     Climbing 3-5 steps with a railing? 3  -LR     To walk in hospital room? 3  -LR     AM-PAC 6 Clicks Score (PT) 18  -LR     Highest Level of Mobility Goal 6 --> Walk 10 steps or more  -LR       Row Name 05/16/24 0834          Functional Assessment    Outcome Measure Options AM-PAC 6 Clicks Basic Mobility (PT)  -LR               User Key  (r) = Recorded By, (t) = Taken By, (c) = Cosigned By      Initials Name Provider Type    LR Rhona Noonan, PT Physical Therapist                                 Physical Therapy Education       Title: PT OT SLP Therapies (Done)       Topic: Physical Therapy (Done)       Point: Mobility training (Done)       Learning Progress Summary             Patient Acceptance, E,D,H, VU,NR by LR at 5/16/2024 0834    Comment: Issued/reviewed written/illustrated HEP. Educated on spinal precautions, corret log rolling technique, correct sit<->stand t/f technique, correct gait mehcanics, and progression of POC.   Family Acceptance, E,D,H, VU,NR by LR at 5/16/2024 0834    Comment: Issued/reviewed written/illustrated HEP. Educated on spinal precautions, corret log rolling technique, correct sit<->stand t/f technique, correct gait mehcanics, and progression of POC.                         Point: Home exercise program (Done)       Learning Progress Summary             Patient Acceptance, E,D,H, VU,NR by LR at 5/16/2024 0834    Comment: Issued/reviewed written/illustrated HEP. Educated  on spinal precautions, corret log rolling technique, correct sit<->stand t/f technique, correct gait mehcanics, and progression of POC.   Family Acceptance, E,D,H, VU,NR by LR at 5/16/2024 0834    Comment: Issued/reviewed written/illustrated HEP. Educated on spinal precautions, corret log rolling technique, correct sit<->stand t/f technique, correct gait mehcanics, and progression of POC.                         Point: Body mechanics (Done)       Learning Progress Summary             Patient Acceptance, E,D,H, VU,NR by LR at 5/16/2024 0834    Comment: Issued/reviewed written/illustrated HEP. Educated on spinal precautions, corret log rolling technique, correct sit<->stand t/f technique, correct gait mehcanics, and progression of POC.   Family Acceptance, E,D,H, VU,NR by LR at 5/16/2024 0834    Comment: Issued/reviewed written/illustrated HEP. Educated on spinal precautions, corret log rolling technique, correct sit<->stand t/f technique, correct gait mehcanics, and progression of POC.                         Point: Precautions (Done)       Learning Progress Summary             Patient Acceptance, E,D,H, VU,NR by LR at 5/16/2024 0834    Comment: Issued/reviewed written/illustrated HEP. Educated on spinal precautions, corret log rolling technique, correct sit<->stand t/f technique, correct gait mehcanics, and progression of POC.   Family Acceptance, E,D,H, VU,NR by LR at 5/16/2024 0834    Comment: Issued/reviewed written/illustrated HEP. Educated on spinal precautions, corret log rolling technique, correct sit<->stand t/f technique, correct gait mehcanics, and progression of POC.                                         User Key       Initials Effective Dates Name Provider Type Discipline     02/03/23 -  Rhona Noonan, PT Physical Therapist PT                  PT Recommendation and Plan  Planned Therapy Interventions (PT): balance training, bed mobility training, gait training, home exercise program,  patient/family education, ROM (range of motion), strengthening, transfer training, stair training  Plan of Care Reviewed With: patient, daughter  Progress: improving  Outcome Evaluation: Patient ambulated 200 feet with RW, CGA, step through gait pattern, limited by fatigue and weakness. Patient demonstrated moderate balance deficits upon standing up, leaning posteriorly, requiring assist and cues for correction. Patient reports he has balance deficits at baseline. Patient currently below baseline functioning, demonstrating decreased functional mobility status, impaired balance, decreased endurance, and decreased strength. Will address these deficits to promote return to PLOF. Recommend d/c home with assist. Needs RW for home. Recommend OT consult for ADL and adaptive equipment training.     Time Calculation:   PT Evaluation Complexity  History, PT Evaluation Complexity: 3 or more personal factors and/or comorbidities  Examination of Body Systems (PT Eval Complexity): total of 3 or more elements  Clinical Presentation (PT Evaluation Complexity): stable  Clinical Decision Making (PT Evaluation Complexity): low complexity  Overall Complexity (PT Evaluation Complexity): low complexity     PT Charges       Row Name 05/16/24 0834             Time Calculation    Start Time 0834  -LR      PT Received On 05/16/24  -LR      PT Goal Re-Cert Due Date 05/26/24  -LR         Timed Charges    96362 - PT Therapeutic Exercise Minutes 10  -LR      83198 - Gait Training Minutes  15  -LR         Untimed Charges    PT Eval/Re-eval Minutes 60  -LR         Total Minutes    Timed Charges Total Minutes 25  -LR      Untimed Charges Total Minutes 60  -LR       Total Minutes 85  -LR                User Key  (r) = Recorded By, (t) = Taken By, (c) = Cosigned By      Initials Name Provider Type    Rhona Bolaños, PT Physical Therapist                  Therapy Charges for Today       Code Description Service Date Service Provider  Modifiers Qty    [id number]  PT THER PROC EA 15 MIN 5/16/2024 Rhona Noonan, PT GP 1    [id number] HC GAIT TRAINING EA 15 MIN 5/16/2024 Rhona Noonan, PT GP 1    [id number]  PT EVAL LOW COMPLEXITY 4 5/16/2024 Rhona Noonan, PT GP 1            PT G-Codes  Outcome Measure Options: AM-PAC 6 Clicks Basic Mobility (PT)  AM-PAC 6 Clicks Score (PT): 18  PT Discharge Summary  Anticipated Discharge Disposition (PT): home with assist    Rhona Noonan, PT  5/16/2024

## 2024-05-16 NOTE — PROGRESS NOTES
"HOD# : 1    No events last night  \"Leg pain better \"    Sciatica associated with disorder of lumbar spine    Neurogenic claudication      Temp:  [96.3 °F (35.7 °C)-99.9 °F (37.7 °C)] 97.7 °F (36.5 °C)  Heart Rate:  [65-81] 67  Resp:  [17-21] 18  BP: (101-134)/(66-80) 121/68  I/O last 3 completed shifts:  In: 1500 [I.V.:1000; IV Piggyback:500]  Out: 1300 [Urine:1100; Drains:100; Blood:100]  I/O this shift:  In: 300 [P.O.:300]  Out: 160 [Urine:150; Drains:10]  Vital signs were reviewed and documented in the chart      EXAM   Patient appeared in good neurologic function with normal comprehension   CN grossly intact  Moves all extremities to command  Dressing is dry    Assessment plan    1.  Status post laminectomy small pinhole durotomy repair no headache etc.      2.  Severe neurogenic claudication    3.  Fall risk    Plan    1.  DC SANA    2.  Sterile dressing change    3.  Ambulate today    4.  Hold anticoagulation until Monday  "

## 2024-05-16 NOTE — PLAN OF CARE
Goal Outcome Evaluation:  Plan of Care Reviewed With: patient, daughter        Progress: improving  Outcome Evaluation: Pt. educated on spinal precautions during ADLs and functional mobility. Reviewed AE to facilitate maintaining spinal precautions. Recommend continued review to ensure carry over. Recommend home with assist at discharge.      Anticipated Discharge Disposition (OT): home with assist

## 2024-05-17 PROCEDURE — 63710000001 BUDESONIDE 3 MG CAPSULE DELAYED-RELEASE PARTICLES: Performed by: PHYSICIAN ASSISTANT

## 2024-05-17 PROCEDURE — 63710000001 FUROSEMIDE 20 MG TABLET: Performed by: NEUROLOGICAL SURGERY

## 2024-05-17 PROCEDURE — 63710000001 IBUPROFEN 400 MG TABLET: Performed by: NEUROLOGICAL SURGERY

## 2024-05-17 PROCEDURE — A9270 NON-COVERED ITEM OR SERVICE: HCPCS | Performed by: NEUROLOGICAL SURGERY

## 2024-05-17 PROCEDURE — 63710000001 SENNOSIDES-DOCUSATE 8.6-50 MG TABLET: Performed by: NEUROLOGICAL SURGERY

## 2024-05-17 PROCEDURE — 97530 THERAPEUTIC ACTIVITIES: CPT

## 2024-05-17 PROCEDURE — 63710000001 METOPROLOL SUCCINATE XL 25 MG TABLET SUSTAINED-RELEASE 24 HOUR: Performed by: NEUROLOGICAL SURGERY

## 2024-05-17 PROCEDURE — 99024 POSTOP FOLLOW-UP VISIT: CPT | Performed by: PHYSICIAN ASSISTANT

## 2024-05-17 PROCEDURE — 63710000001 ATORVASTATIN 40 MG TABLET: Performed by: NEUROLOGICAL SURGERY

## 2024-05-17 PROCEDURE — 63710000001 AMIODARONE 200 MG TABLET: Performed by: NEUROLOGICAL SURGERY

## 2024-05-17 PROCEDURE — 97110 THERAPEUTIC EXERCISES: CPT

## 2024-05-17 PROCEDURE — 63710000001 LEVETIRACETAM 500 MG TABLET: Performed by: NEUROLOGICAL SURGERY

## 2024-05-17 PROCEDURE — A9270 NON-COVERED ITEM OR SERVICE: HCPCS | Performed by: PHYSICIAN ASSISTANT

## 2024-05-17 PROCEDURE — 63710000001 SPIRONOLACTONE 25 MG TABLET: Performed by: NEUROLOGICAL SURGERY

## 2024-05-17 PROCEDURE — 97116 GAIT TRAINING THERAPY: CPT

## 2024-05-17 PROCEDURE — 25010000002 CEFAZOLIN PER 500 MG: Performed by: PHYSICIAN ASSISTANT

## 2024-05-17 RX ORDER — APIXABAN 5 MG/1
5 TABLET, FILM COATED ORAL 2 TIMES DAILY
Qty: 60 TABLET | Refills: 0 | Status: SHIPPED | OUTPATIENT
Start: 2024-05-17

## 2024-05-17 RX ORDER — TRAMADOL HYDROCHLORIDE 50 MG/1
50 TABLET ORAL EVERY 6 HOURS PRN
Qty: 24 TABLET | Refills: 0 | Status: SHIPPED | OUTPATIENT
Start: 2024-05-17 | End: 2024-05-23

## 2024-05-17 RX ORDER — METHOCARBAMOL 750 MG/1
750 TABLET, FILM COATED ORAL 3 TIMES DAILY
Qty: 60 TABLET | Refills: 0 | Status: SHIPPED | OUTPATIENT
Start: 2024-05-17

## 2024-05-17 RX ADMIN — FUROSEMIDE 20 MG: 20 TABLET ORAL at 09:26

## 2024-05-17 RX ADMIN — BUDESONIDE 9 MG: 3 CAPSULE, COATED PELLETS ORAL at 09:26

## 2024-05-17 RX ADMIN — AMIODARONE HYDROCHLORIDE 200 MG: 200 TABLET ORAL at 09:26

## 2024-05-17 RX ADMIN — IBUPROFEN 200 MG: 400 TABLET, FILM COATED ORAL at 09:27

## 2024-05-17 RX ADMIN — LEVETIRACETAM 500 MG: 500 TABLET, FILM COATED ORAL at 09:26

## 2024-05-17 RX ADMIN — SENNOSIDES AND DOCUSATE SODIUM 2 TABLET: 8.6; 5 TABLET ORAL at 09:26

## 2024-05-17 RX ADMIN — LEVETIRACETAM 500 MG: 500 TABLET, FILM COATED ORAL at 20:15

## 2024-05-17 RX ADMIN — SPIRONOLACTONE 25 MG: 25 TABLET ORAL at 20:15

## 2024-05-17 RX ADMIN — SPIRONOLACTONE 25 MG: 25 TABLET ORAL at 09:26

## 2024-05-17 RX ADMIN — METOPROLOL SUCCINATE 25 MG: 25 TABLET, EXTENDED RELEASE ORAL at 09:26

## 2024-05-17 RX ADMIN — SODIUM CHLORIDE 2000 MG: 900 INJECTION INTRAVENOUS at 14:14

## 2024-05-17 RX ADMIN — ATORVASTATIN CALCIUM 80 MG: 40 TABLET, FILM COATED ORAL at 20:15

## 2024-05-17 RX ADMIN — IBUPROFEN 200 MG: 400 TABLET, FILM COATED ORAL at 20:15

## 2024-05-17 RX ADMIN — Medication 10 ML: at 09:27

## 2024-05-17 RX ADMIN — Medication 10 ML: at 20:16

## 2024-05-17 NOTE — CASE MANAGEMENT/SOCIAL WORK
Discharge Planning Assessment  [facility]     Patient Name: Isaiah Le  MRN: [id number]  Today's Date: 5/17/2024    Admit Date: 5/15/2024    Plan: Home with family assistance and a rolling walker from [employer]       Discharge Plan       Row Name 05/17/24 1520       Plan    Plan Home with family assistance and a rolling walker from [employer]    Patient/Family in Agreement with Plan yes    Plan Comments DC plan for Mr. Le continues to be to return home with his ex-wife's assistance, Debora.    A rolling walker has already been delivered to the patient's room from [employer].  Debora to transport home.    CM will continue to follow.    Final Discharge Disposition Code 01 - home or self-care                  Continued Care and Services - Admitted Since 5/15/2024       Durable Medical Equipment       Service Provider Request Status Selected Services Address Phone Fax Patient Preferred    [facility] Pending - No Request Sent N/A [address] [address] [phone] --                  Expected Discharge Date and Time       Expected Discharge Date Expected Discharge Time    May 18, 2024           Stacie Ahumada RN

## 2024-05-17 NOTE — PLAN OF CARE
Goal Outcome Evaluation:  Plan of Care Reviewed With: patient        Progress: improving  Outcome Evaluation: Patient able to progress ambulation distance to 300' CGA with FWW and navigate a platform step x2 reps with CGA and FWW. Requires cues for safe use of FWW, but was able to verbalize all spinal precautions and HEP reviewed with good effort. IPPT remains indicated to address current deficits. Recommend D/C home with assist when medically ready.      Anticipated Discharge Disposition (PT): home with assist

## 2024-05-17 NOTE — PROGRESS NOTES
HOD# : 1    No events last night  Patient did well with physical therapy and is resting well this morning.    Patient's lower extremity pain has improved with ambulating    Sciatica associated with disorder of lumbar spine    Neurogenic claudication      Temp:  [97.7 °F (36.5 °C)-98.5 °F (36.9 °C)] 98.2 °F (36.8 °C)  Heart Rate:  [65-73] 73  Resp:  [18] 18  BP: (101-127)/(56-81) 127/75  I/O last 3 completed shifts:  In: 2400 [P.O.:900; I.V.:1000; IV Piggyback:500]  Out: 2055 [Urine:1750; Drains:205; Blood:100]  I/O this shift:  In: -   Out: 615 [Urine:450; Drains:165]  Vital signs were reviewed and documented in the chart      EXAM   Body mass index is 25.64 kg/m².      Patient appeared in good neurologic function with normal comprehension   CN grossly intact  Moves all extremities to command      DIAGNOSIS  Lumbar spinal stenosis      PLAN    DC home today

## 2024-05-17 NOTE — PLAN OF CARE
Problem: Adult Inpatient Plan of Care  Goal: Absence of Hospital-Acquired Illness or Injury  Intervention: Identify and Manage Fall Risk  Recent Flowsheet Documentation  Taken 5/17/2024 0200 by Solomon Crook RN  Safety Promotion/Fall Prevention: safety round/check completed  Taken 5/17/2024 0000 by Solomon Crook RN  Safety Promotion/Fall Prevention: safety round/check completed  Taken 5/16/2024 2200 by Solomon Crook RN  Safety Promotion/Fall Prevention: safety round/check completed  Taken 5/16/2024 2025 by Solomon Crook RN  Safety Promotion/Fall Prevention:   activity supervised   clutter free environment maintained   gait belt   nonskid shoes/slippers when out of bed   room organization consistent     Problem: Adult Inpatient Plan of Care  Goal: Absence of Hospital-Acquired Illness or Injury  Intervention: Prevent Skin Injury  Recent Flowsheet Documentation  Taken 5/17/2024 0200 by Solomon Crook RN  Body Position: position changed independently  Taken 5/17/2024 0000 by Solomon Crook RN  Body Position: position changed independently  Taken 5/16/2024 2200 by Solomon Crook RN  Body Position: position changed independently  Taken 5/16/2024 2025 by Solomon Crook RN  Body Position: position changed independently     Problem: Adult Inpatient Plan of Care  Goal: Absence of Hospital-Acquired Illness or Injury  Intervention: Prevent and Manage VTE (Venous Thromboembolism) Risk  Recent Flowsheet Documentation  Taken 5/17/2024 0000 by Solomon Crook RN  Activity Management: ambulated to bathroom  Taken 5/16/2024 2025 by Solomon Crook RN  Activity Management:   ambulated in room   back to bed  VTE Prevention/Management: patient refused intervention  Range of Motion: ROM (range of motion) performed   Goal Outcome Evaluation:

## 2024-05-17 NOTE — THERAPY TREATMENT NOTE
Patient Name: Isaiah Le  : 1950    MRN: [id number]                              Today's Date: 2024       Admit Date: 5/15/2024    Visit Dx:     ICD-10-CM ICD-9-CM   1. Neurogenic claudication  R29.818 781.99   2. Sciatica associated with disorder of lumbar spine  M53.86 724.3     Patient Active Problem List   Diagnosis    Neurogenic claudication    Sciatica associated with disorder of lumbar spine     Past Medical History:   Diagnosis Date    Atrial fibrillation     History of transfusion     Hyperlipidemia     Hypertension     Lumbosacral disc disease     Ulcerative colitis     Wears dentures     UPPER AND LOWER    Wears glasses     Wears hearing aid in both ears      Past Surgical History:   Procedure Laterality Date    CATARACT EXTRACTION Bilateral     COLONOSCOPY      WITH POLYPS REMOVED    LUMBAR LAMINECTOMY DISCECTOMY DECOMPRESSION N/A 5/15/2024    Procedure: Lumbar laminectomy L4-5 open, LEFT L5-S1 FORAMINOTOMY;  Surgeon: Abbe Nice MD;  Location: [facility];  Service: Neurosurgery;  Laterality: N/A;    REPLACEMENT TOTAL KNEE Right       General Information       Row Name 24 1053          Physical Therapy Time and Intention    Document Type therapy note (daily note)  -CK     Mode of Treatment physical therapy;individual therapy  -CK       Row Name 24 1053          General Information    Patient Profile Reviewed yes  -CK     Existing Precautions/Restrictions fall;spinal;other (see comments)  SANA  -CK     Barriers to Rehab previous functional deficit;hearing deficit  -CK       Row Name 24 1053          Cognition    Orientation Status (Cognition) oriented x 4  -CK       Row Name 24 1053          Safety Issues, Functional Mobility    Safety Issues Affecting Function (Mobility) awareness of need for assistance;impulsivity;insight into deficits/self-awareness;positioning of assistive device;safety precaution awareness;safety precautions  follow-through/compliance  -CK     Impairments Affecting Function (Mobility) balance;endurance/activity tolerance;postural/trunk control;strength  -CK               User Key  (r) = Recorded By, (t) = Taken By, (c) = Cosigned By      Initials Name Provider Type    CK Milagro Smith, PT Physical Therapist                   Mobility       Row Name 05/17/24 1054          Bed Mobility    Bed Mobility supine-sit  -CK     Supine-Sit [place] (Bed Mobility) contact guard;verbal cues  -CK     Assistive Device (Bed Mobility) bed rails  -CK     Comment, (Bed Mobility) cues for logroll technique, patient denied dizziness or headache in standing  -CK       Row Name 05/17/24 1054          Sit-Stand Transfer    Sit-Stand [place] (Transfers) 1 person assist;verbal cues;minimum assist (75% patient effort)  -CK     Assistive Device (Sit-Stand Transfers) walker, front-wheeled  -CK     Comment, (Sit-Stand Transfer) patient attempting to stand/sit multiple times without AD, educated on importance of always mobilizing with walker and bringing it back to chair prior to sitting. CGA to stand and then 1 LOB upon standing Nabeel to correct  -CK       Row Name 05/17/24 1054          Gait/Stairs (Locomotion)    [place] Level (Gait) verbal cues;contact guard;1 person assist  -CK     Assistive Device (Gait) walker, front-wheeled  -CK     Distance in Feet (Gait) 300  -CK     Deviations/Abnormal Patterns (Gait) bilateral deviations;christy decreased;gait speed decreased;stride length decreased;base of support, narrow  -CK     Bilateral Gait Deviations heel strike decreased;forward flexed posture  -CK     [place] Level (Stairs) contact guard;1 person assist;verbal cues  -CK     Assistive Device (Stairs) walker, front-wheeled  -CK     Number of Steps (Stairs) 1+1  -CK     Ascending Technique (Stairs) step-to-step  -CK     Descending Technique (Stairs) step-to-step  -CK     Comment, (Gait/Stairs) Patient ambulated in sky with  step through gait pattern, narrow VICKIE at times but no overt LOB while ambulating. Cues for safe proximity of AD closer to body, patient tends to become forward flexed and push walker away from him. Patient able to navigate single platform step x2 reps using posterior approach with FWW and cues for sequencing. He demonstrated good strength and balance with this.  -CK               User Key  (r) = Recorded By, (t) = Taken By, (c) = Cosigned By      Initials Name Provider Type    CK Milagro Smith, PT Physical Therapist                   Obj/Interventions       Row Name 05/17/24 1101          Motor Skills    Therapeutic Exercise hip;knee;ankle;other (see comments)  abdominal sets, bilateral shoulder flexion, BNF all 1x10  -CK       Row Name 05/17/24 1101          Hip (Therapeutic Exercise)    Hip (Therapeutic Exercise) strengthening exercise;isometric exercises  -CK     Hip Isometrics (Therapeutic Exercise) bilateral;gluteal sets;10 repetitions;3 second hold  -CK     Hip Strengthening (Therapeutic Exercise) bilateral;heel slides;external rotation;internal rotation;10 repetitions  -CK       Row Name 05/17/24 1101          Knee (Therapeutic Exercise)    Knee (Therapeutic Exercise) strengthening exercise;isometric exercises  -CK     Knee Isometrics (Therapeutic Exercise) bilateral;quad sets;10 repetitions;3 second hold  -CK       Row Name 05/17/24 1101          Ankle (Therapeutic Exercise)    Ankle (Therapeutic Exercise) AROM (active range of motion)  -CK     Ankle AROM (Therapeutic Exercise) bilateral;dorsiflexion;plantarflexion;10 repetitions  -CK       Row Name 05/17/24 1101          Balance    Balance Assessment sitting static balance;standing static balance;standing dynamic balance  -CK     Static Sitting Balance standby assist  -CK     Position, Sitting Balance unsupported;sitting edge of bed;sitting in chair  -CK     Static Standing Balance contact guard  -CK     Dynamic Standing Balance contact guard  -CK      Position/Device Used, Standing Balance supported;walker, front-wheeled  -CK     Comment, Balance one LOB upon standing Nabeel to correct  -CK               User Key  (r) = Recorded By, (t) = Taken By, (c) = Cosigned By      Initials Name Provider Type    CK Milagro Smith, PT Physical Therapist                   Goals/Plan    No documentation.                  Clinical Impression       Row Name 05/17/24 1102          Pain    Pretreatment Pain Rating 0/10 - no pain  -CK     Posttreatment Pain Rating 0/10 - no pain  -CK       Row Name 05/17/24 1102          Plan of Care Review    Plan of Care Reviewed With patient  -CK     Progress improving  -CK     Outcome Evaluation Patient able to progress ambulation distance to 300' CGA with FWW and navigate a platform step x2 reps with CGA and FWW. Requires cues for safe use of FWW, but was able to verbalize all spinal precautions and HEP reviewed with good effort. IPPT remains indicated to address current deficits. Recommend D/C home with assist when medically ready.  -CK       Row Name 05/17/24 1102          Vital Signs    Pre Systolic BP Rehab 126  -CK     Pre Treatment Diastolic BP 80  -CK     Post Systolic BP Rehab 149  -CK     Post Treatment Diastolic BP 84  -CK     Pretreatment Heart Rate (beats/min) 61  -CK     Posttreatment Heart Rate (beats/min) 68  -CK     Pre SpO2 (%) 96  -CK     O2 Delivery Pre Treatment room air  -CK     O2 Delivery Intra Treatment room air  -CK     Post SpO2 (%) 97  -CK     O2 Delivery Post Treatment room air  -CK     Pre Patient Position Supine  -CK     Post Patient Position Sitting  -CK       Row Name 05/17/24 1102          Positioning and Restraints    Pre-Treatment Position in bed  -CK     Post Treatment Position chair  -CK     In Chair reclined;call light within reach;encouraged to call for assist;exit alarm on;waffle cushion;notified nsg  -CK               User Key  (r) = Recorded By, (t) = Taken By, (c) = Cosigned By      Initials Name  Provider Type    Milagro John, INOCENTE Physical Therapist                   Outcome Measures       Row Name 05/17/24 1104          How much help from another person do you currently need...    Turning from your back to your side while in flat bed without using bedrails? 3  -CK     Moving from lying on back to sitting on the side of a flat bed without bedrails? 3  -CK     Moving to and from a bed to a chair (including a wheelchair)? 3  -CK     Standing up from a chair using your arms (e.g., wheelchair, bedside chair)? 3  -CK     Climbing 3-5 steps with a railing? 3  -CK     To walk in hospital room? 3  -CK     AM-PAC 6 Clicks Score (PT) 18  -CK     Highest Level of Mobility Goal 6 --> Walk 10 steps or more  -CK       Row Name 05/17/24 1104          Functional Assessment    Outcome Measure Options AM-PAC 6 Clicks Basic Mobility (PT)  -CK               User Key  (r) = Recorded By, (t) = Taken By, (c) = Cosigned By      Initials Name Provider Type    Milagro John PT Physical Therapist                                 Physical Therapy Education       Title: PT OT SLP Therapies (In Progress)       Topic: Physical Therapy (Done)       Point: Mobility training (Done)       Learning Progress Summary             Patient Acceptance, E, VU by CK at 5/17/2024 1105    Acceptance, E,D,H, VU,NR by LR at 5/16/2024 0834    Comment: Issued/reviewed written/illustrated HEP. Educated on spinal precautions, corret log rolling technique, correct sit<->stand t/f technique, correct gait mehcanics, and progression of POC.   Family Acceptance, E,D,H, VU,NR by LR at 5/16/2024 0834    Comment: Issued/reviewed written/illustrated HEP. Educated on spinal precautions, corret log rolling technique, correct sit<->stand t/f technique, correct gait mehcanics, and progression of POC.                         Point: Home exercise program (Done)       Learning Progress Summary             Patient Acceptance, E, VU by CK at 5/17/2024 1105     Acceptance, E,D,H, VU,NR by LR at 5/16/2024 0834    Comment: Issued/reviewed written/illustrated HEP. Educated on spinal precautions, corret log rolling technique, correct sit<->stand t/f technique, correct gait mehcanics, and progression of POC.   Family Acceptance, E,D,H, VU,NR by LR at 5/16/2024 0834    Comment: Issued/reviewed written/illustrated HEP. Educated on spinal precautions, corret log rolling technique, correct sit<->stand t/f technique, correct gait mehcanics, and progression of POC.                         Point: Body mechanics (Done)       Learning Progress Summary             Patient Acceptance, E, VU by CK at 5/17/2024 1105    Acceptance, E,D,H, VU,NR by LR at 5/16/2024 0834    Comment: Issued/reviewed written/illustrated HEP. Educated on spinal precautions, corret log rolling technique, correct sit<->stand t/f technique, correct gait mehcanics, and progression of POC.   Family Acceptance, E,D,H, VU,NR by LR at 5/16/2024 0834    Comment: Issued/reviewed written/illustrated HEP. Educated on spinal precautions, corret log rolling technique, correct sit<->stand t/f technique, correct gait mehcanics, and progression of POC.                         Point: Precautions (Done)       Learning Progress Summary             Patient Acceptance, E, VU by CK at 5/17/2024 1105    Acceptance, E,D,H, VU,NR by LR at 5/16/2024 0834    Comment: Issued/reviewed written/illustrated HEP. Educated on spinal precautions, corret log rolling technique, correct sit<->stand t/f technique, correct gait mehcanics, and progression of POC.   Family Acceptance, E,D,H, VU,NR by LR at 5/16/2024 0834    Comment: Issued/reviewed written/illustrated HEP. Educated on spinal precautions, corret log rolling technique, correct sit<->stand t/f technique, correct gait mehcanics, and progression of POC.                                         User Key       Initials Effective Dates Name Provider Type Discipline    LR 02/03/23 -  Rhona Noonan  Monica, PT Physical Therapist PT    CK 02/06/24 -  Milagro Smith PT Physical Therapist PT                  PT Recommendation and Plan     Plan of Care Reviewed With: patient  Progress: improving  Outcome Evaluation: Patient able to progress ambulation distance to 300' CGA with FWW and navigate a platform step x2 reps with CGA and FWW. Requires cues for safe use of FWW, but was able to verbalize all spinal precautions and HEP reviewed with good effort. IPPT remains indicated to address current deficits. Recommend D/C home with assist when medically ready.     Time Calculation:         PT Charges       Row Name 05/17/24 1105             Time Calculation    Start Time 1014  -CK      PT Received On 05/17/24  -CK         Timed Charges    76083 - PT Therapeutic Exercise Minutes 15  -CK      14832 - Gait Training Minutes  15  -CK      34452 - PT Therapeutic Activity Minutes 8  -CK         Total Minutes    Timed Charges Total Minutes 38  -CK       Total Minutes 38  -CK                User Key  (r) = Recorded By, (t) = Taken By, (c) = Cosigned By      Initials Name Provider Type    CK Milagro Smith, INOCENTE Physical Therapist                  Therapy Charges for Today       Code Description Service Date Service Provider Modifiers Qty    93431437737 HC PT THER PROC EA 15 MIN 5/17/2024 Milagro Smith, PT GP 1    71957840047 HC GAIT TRAINING EA 15 MIN 5/17/2024 Milagro Smith, PT GP 1    38919378454 HC PT THERAPEUTIC ACT EA 15 MIN 5/17/2024 Milagro Smith, PT GP 1            PT G-Codes  Outcome Measure Options: AM-PAC 6 Clicks Basic Mobility (PT)  AM-PAC 6 Clicks Score (PT): 18  AM-PAC 6 Clicks Score (OT): 18  PT Discharge Summary  Anticipated Discharge Disposition (PT): home with assist    Milagro Smith PT  5/17/2024

## 2024-05-18 VITALS
OXYGEN SATURATION: 95 % | WEIGHT: 197 LBS | BODY MASS INDEX: 25.28 KG/M2 | SYSTOLIC BLOOD PRESSURE: 121 MMHG | RESPIRATION RATE: 18 BRPM | DIASTOLIC BLOOD PRESSURE: 74 MMHG | TEMPERATURE: 98.3 F | HEIGHT: 74 IN | HEART RATE: 61 BPM

## 2024-05-18 PROCEDURE — A9270 NON-COVERED ITEM OR SERVICE: HCPCS | Performed by: NEUROLOGICAL SURGERY

## 2024-05-18 PROCEDURE — 63710000001 LEVETIRACETAM 500 MG TABLET: Performed by: NEUROLOGICAL SURGERY

## 2024-05-18 PROCEDURE — 63710000001 IBUPROFEN 400 MG TABLET: Performed by: NEUROLOGICAL SURGERY

## 2024-05-18 PROCEDURE — 63710000001 AMIODARONE 200 MG TABLET: Performed by: NEUROLOGICAL SURGERY

## 2024-05-18 PROCEDURE — 63710000001 METOPROLOL SUCCINATE XL 25 MG TABLET SUSTAINED-RELEASE 24 HOUR: Performed by: NEUROLOGICAL SURGERY

## 2024-05-18 PROCEDURE — 63710000001 BUDESONIDE 3 MG CAPSULE DELAYED-RELEASE PARTICLES: Performed by: PHYSICIAN ASSISTANT

## 2024-05-18 PROCEDURE — 63710000001 SPIRONOLACTONE 25 MG TABLET: Performed by: NEUROLOGICAL SURGERY

## 2024-05-18 PROCEDURE — 63710000001 FUROSEMIDE 20 MG TABLET: Performed by: NEUROLOGICAL SURGERY

## 2024-05-18 PROCEDURE — A9270 NON-COVERED ITEM OR SERVICE: HCPCS | Performed by: PHYSICIAN ASSISTANT

## 2024-05-18 RX ADMIN — SPIRONOLACTONE 25 MG: 25 TABLET ORAL at 08:21

## 2024-05-18 RX ADMIN — AMIODARONE HYDROCHLORIDE 200 MG: 200 TABLET ORAL at 08:20

## 2024-05-18 RX ADMIN — BUDESONIDE 9 MG: 3 CAPSULE, COATED PELLETS ORAL at 08:21

## 2024-05-18 RX ADMIN — LEVETIRACETAM 500 MG: 500 TABLET, FILM COATED ORAL at 08:20

## 2024-05-18 RX ADMIN — IBUPROFEN 200 MG: 400 TABLET, FILM COATED ORAL at 08:20

## 2024-05-18 RX ADMIN — Medication 10 ML: at 08:21

## 2024-05-18 RX ADMIN — METOPROLOL SUCCINATE 25 MG: 25 TABLET, EXTENDED RELEASE ORAL at 08:21

## 2024-05-18 RX ADMIN — FUROSEMIDE 20 MG: 20 TABLET ORAL at 08:21

## 2024-05-18 NOTE — PLAN OF CARE
Goal Outcome Evaluation:  Plan of Care Reviewed With: patient        Progress: no change          Pt slept throughout the night. VSS. RA. HOB flat. Denied pain. Voiding well.

## 2024-05-18 NOTE — PLAN OF CARE
Goal Outcome Evaluation:      HOB elevated by 10 degrees hourly per order. HOB reached 50 degrees and then patient ambulated. Patient tolerated well and no headache reported. Dressing changed prior to discharge and was C/D/I. Patient discharged home.

## 2024-05-18 NOTE — PROGRESS NOTES
"  [facility]    NEUROSURGERY PROGRESS NOTE    05/18/24    Chief Complaint: Neurogenic claudication    Subjective: Stable overnight.  Patient HOB flat overnight.  Denies any pain radiating down his legs.  Drain removed yesterday    3 Days Post-Op    Objective:     /71   Pulse 67   Temp 97.9 °F (36.6 °C) (Oral)   Resp 18   Ht 186.7 cm (73.5\")   Wt 89.4 kg (197 lb)   SpO2 97%   BMI 25.64 kg/m²        Physical Exam  Patient appears comfortable, resting  Awake, alert and oriented x 3  Speech f/c  Opens eyes spontaneously  EOM intact bilaterally  Pupils 3mm reactive bilaterally  Face symmetric  Tongue midline  Moves all extremities with good strength  Incision clean dry and intact, no evidence of drainage or swelling        Intake/Output Summary (Last 24 hours) at 5/18/2024 0834  Last data filed at 5/18/2024 0833  Gross per 24 hour   Intake 750 ml   Output 1395 ml   Net -645 ml         Current Facility-Administered Medications:     amiodarone (PACERONE) tablet 200 mg, 200 mg, Oral, Daily, Abbe Nice MD, 200 mg at 05/18/24 0820    atorvastatin (LIPITOR) tablet 80 mg, 80 mg, Oral, Nightly, Abbe Nice MD, 80 mg at 05/17/24 2015    sennosides-docusate (PERICOLACE) 8.6-50 MG per tablet 2 tablet, 2 tablet, Oral, BID, 2 tablet at 05/17/24 0926 **AND** polyethylene glycol (MIRALAX) packet 17 g, 17 g, Oral, Daily PRN **AND** bisacodyl (DULCOLAX) EC tablet 5 mg, 5 mg, Oral, Daily PRN **AND** bisacodyl (DULCOLAX) suppository 10 mg, 10 mg, Rectal, Daily PRN, Abbe Nice MD    Budesonide (ENTOCORT EC) 24 hr capsule 9 mg, 9 mg, Oral, Daily, Boy Jett PA-C, 9 mg at 05/18/24 0821    furosemide (LASIX) tablet 20 mg, 20 mg, Oral, Daily, Abbe Nice MD, 20 mg at 05/18/24 0821    HYDROcodone-acetaminophen (NORCO) 5-325 MG per tablet 1 tablet, 1 tablet, Oral, Q4H PRN, Abbe Nice MD, 1 tablet at 05/16/24 2046    ibuprofen " (ADVIL,MOTRIN) tablet 200 mg, 200 mg, Oral, Q4H PRN, Abbe Nice MD, 200 mg at 05/18/24 0820    lactated ringers infusion, 9 mL/hr, Intravenous, Continuous, Abbe Nice MD, Last Rate: 9 mL/hr at 05/15/24 0917, New Bag at 05/15/24 1214    levETIRAcetam (KEPPRA) tablet 500 mg, 500 mg, Oral, BID, Abbe Nice MD, 500 mg at 05/18/24 0820    metoprolol succinate XL (TOPROL-XL) 24 hr tablet 25 mg, 25 mg, Oral, Daily, Abbe Nice MD, 25 mg at 05/18/24 0821    ondansetron ODT (ZOFRAN-ODT) disintegrating tablet 4 mg, 4 mg, Oral, Q6H PRN **OR** ondansetron (ZOFRAN) injection 4 mg, 4 mg, Intravenous, Q6H PRN, Abbe Nice MD    sodium chloride 0.9 % flush 10 mL, 10 mL, Intravenous, Q12H, Abbe Nice MD, 10 mL at 05/18/24 0821    sodium chloride 0.9 % flush 10 mL, 10 mL, Intravenous, PRN, Abbe Nice MD    sodium chloride 0.9 % infusion 40 mL, 40 mL, Intravenous, PRN, Abbe Nice MD    sodium chloride 0.9 % with KCl 20 mEq/L infusion, 75 mL/hr, Intravenous, Continuous, Abbe Nice MD, Last Rate: 75 mL/hr at 05/15/24 1753, 75 mL/hr at 05/15/24 1753    spironolactone (ALDACTONE) tablet 25 mg, 25 mg, Oral, BID, Abbe Nice MD, 25 mg at 05/18/24 0821    temazepam (RESTORIL) capsule 15 mg, 15 mg, Oral, Nightly PRN, Abbe Nice MD, 15 mg at 05/16/24 2046    Laboratory Results:                                Brief Urine Lab Results       None          Microbiology Results (last 10 days)       ** No results found for the last 240 hours. **                     Diagnostic Imaging: I personally reviewed and interpreted the new imaging    Assessment and plan:  This is a 73-year-old male who is status post laminectomy with small pinhole durotomy repair.  Yesterday he experienced a headache after vomiting, therefore his head of bed was laid flat and he was kept like this overnight.  His drain was removed yesterday.  Patient has remained  neurologically stable.  We will gradually raise his head of bed up by 5 degrees an hour.  If he tolerates this without headaches, at 45 degrees we can ambulate the patient.  If he continues to do well, tentatively plan to discharge home later today.  Please call with questions or concerns.      Any copied data from previous notes included in the (1) HPI, (2) PE, (3) MDM and/or Assessment and Plan has been reviewed and accurate as of 05/18/24.    Cheryl Rojas PA-C  05/18/24  08:34 EDT

## 2024-05-18 NOTE — PLAN OF CARE
Goal Outcome Evaluation:      A&Ox4 and VSS on RA. Patient developed headache during shift and PA notified. HOB flat at that time per order. Drain removed by QUETA Horne. Voiding spontaneously. Call light in reach

## 2024-05-23 ENCOUNTER — OFFICE VISIT (OUTPATIENT)
Dept: NEUROSURGERY | Facility: CLINIC | Age: 74
End: 2024-05-23
Payer: MEDICARE

## 2024-05-23 VITALS — HEIGHT: 73 IN | BODY MASS INDEX: 25.45 KG/M2 | TEMPERATURE: 97.3 F | WEIGHT: 192 LBS

## 2024-05-23 DIAGNOSIS — M48.062 SPINAL STENOSIS, LUMBAR REGION, WITH NEUROGENIC CLAUDICATION: Primary | ICD-10-CM

## 2024-05-23 PROCEDURE — 99024 POSTOP FOLLOW-UP VISIT: CPT | Performed by: PHYSICIAN ASSISTANT

## 2024-05-23 NOTE — PROGRESS NOTES
Subjective   Patient ID: Isaiah Le is a 73 y.o. male is here today for follow-up for suture removal.    HPI:      Patient is a 73-year-old gentleman known to the neurosurgical practice for undergoing a lumbar laminectomy.  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  This was closed primarily with stitches.      Patient had an uneventful stay in the hospital up until Friday he had an episode of nausea and vomiting and was laid flat in an abundance of caution.  Drain was discontinued and drain was oversewn.  Patient was discharged home on Saturday.  Patient is here today for short-term follow-up.  Patient is 8 days out of the surgery not having any headaches or signs of wound drainage.  Sutures were removed without incident    The following portions of the patient's history were reviewed and updated as appropriate: allergies, current medications, past family history, past medical history, past social history, past surgical history and problem list.    Review of Systems   Constitutional:  Negative for activity change, appetite change, chills, fatigue and fever.   HENT:  Negative for congestion, dental problem, ear pain, hearing loss, sinus pressure and tinnitus.    Eyes:  Negative for pain and redness.   Respiratory:  Negative for apnea, cough, shortness of breath and wheezing.    Cardiovascular:  Negative for chest pain, palpitations and leg swelling.   Gastrointestinal:  Negative for abdominal distention, abdominal pain, blood in stool, constipation, diarrhea, nausea and vomiting.   Endocrine: Negative for cold intolerance, heat intolerance and polyuria.   Genitourinary:  Negative for enuresis, frequency and urgency.   Skin:  Negative for color change and rash.   Neurological:  Negative for dizziness, tremors, seizures, syncope, speech difficulty, weakness, light-headedness, numbness and  "headaches.   Psychiatric/Behavioral:  Negative for behavioral problems and confusion. The patient is not nervous/anxious.          Objective    reports that he has never smoked. He has never used smokeless tobacco. He reports current alcohol use of about 2.0 standard drinks of alcohol per week. He reports that he does not use drugs.   SMOKING STATUS: Non-smoker    Physical Exam:   Vitals:Temp 97.3 °F (36.3 °C) (Temporal)   Ht 185.4 cm (73\")   Wt 87.1 kg (192 lb)   BMI 25.33 kg/m²    BMI: Body mass index is 25.33 kg/m².       Incision:   The incision is well-healed and well approximated.  No signs of infection, bleeding, or erythema.    Musculoskeletal:                                            Dorsiflexion is 5/5 Bilaterally                    Plantarflexion is 5/5 bilaterally                    Hip Flexion 5/5 bilaterally.                        Neurologic:                                         The patient is alert and oriented by 3.                       Sensation is equal bilaterally with no deficit.                        Reflexes:  2+ throughout       Assessment & Plan   Independent Review of Radiographic Studies:    No new films reviewed at this visit  Medical Decision Making:    We did have the patient take it easy for another couple of weeks and then consider starting PT after the next visit.  Will get a flexion-extension x-ray to ensure there is no increased instability.  He will get those on the way back in next time.    Patient is back on his blood thinners.    Patient has been cautioned on overdoing it and going to [employer] etc.    BMI is >= 25 and <30. (Overweight) The following options were offered after discussion;: weight loss educational material (shared in after visit summary)    There are no diagnoses linked to this encounter.  Return in about 2 weeks (around 6/6/2024).           "

## 2024-06-10 ENCOUNTER — HOSPITAL ENCOUNTER (OUTPATIENT)
Dept: GENERAL RADIOLOGY | Facility: HOSPITAL | Age: 74
Discharge: HOME | End: 2024-06-10
Admitting: PHYSICIAN ASSISTANT
Payer: MEDICARE

## 2024-06-10 DIAGNOSIS — M48.062 SPINAL STENOSIS, LUMBAR REGION, WITH NEUROGENIC CLAUDICATION: ICD-10-CM

## 2024-06-10 PROCEDURE — 72120 X-RAY BEND ONLY L-S SPINE: CPT

## 2024-06-17 ENCOUNTER — OFFICE VISIT (OUTPATIENT)
Dept: NEUROSURGERY | Facility: CLINIC | Age: 74
End: 2024-06-17
Payer: MEDICARE

## 2024-06-17 VITALS
SYSTOLIC BLOOD PRESSURE: 100 MMHG | HEIGHT: 73 IN | DIASTOLIC BLOOD PRESSURE: 80 MMHG | WEIGHT: 193 LBS | BODY MASS INDEX: 25.58 KG/M2 | TEMPERATURE: 97.5 F

## 2024-06-17 DIAGNOSIS — M48.062 SPINAL STENOSIS, LUMBAR REGION, WITH NEUROGENIC CLAUDICATION: Primary | ICD-10-CM

## 2024-06-17 PROCEDURE — 99024 POSTOP FOLLOW-UP VISIT: CPT | Performed by: PHYSICIAN ASSISTANT

## 2024-06-17 NOTE — PROGRESS NOTES
Subjective   Patient ID: Isaiah Le is a 73 y.o. male is here today for follow-up for wound check x-ray review.    HPI:      Patient is a very nice 73-year-old gentleman known to the neurosurgical practice for undergoing a L4-5 laminectomy with left L5-S1 laminoforaminotomy.  Patient had an incidental pinhole durotomy that was closed primarily.  Patient has not had issues with this since the hospitalization.  The day he was discharged she has not had any drainage.  Incision clean dry no sign infection bleed erythema.    Patient notes that his pain when walking is very minimal.  Patient has not had repetitive falls as he did prior to the surgery but did fall once recently.    Patient states that as the day progresses his right foot does feel weaker but shows drastic improvement from his baseline prior to surgery.    Patient has not yet started PT    The following portions of the patient's history were reviewed and updated as appropriate: allergies, current medications, past family history, past medical history, past social history, past surgical history and problem list.    Review of Systems   Constitutional:  Negative for activity change, appetite change, chills, diaphoresis, fatigue, fever and unexpected weight change.   HENT:  Negative for congestion, dental problem, drooling, ear discharge, ear pain, facial swelling, hearing loss, mouth sores, nosebleeds, postnasal drip, rhinorrhea, sinus pressure, sinus pain, sneezing, sore throat, tinnitus, trouble swallowing and voice change.    Eyes:  Negative for photophobia, pain, discharge, redness, itching and visual disturbance.   Respiratory:  Negative for apnea, cough, choking, chest tightness, shortness of breath, wheezing and stridor.    Cardiovascular:  Negative for chest pain, palpitations and leg swelling.   Gastrointestinal:  Negative for abdominal distention, abdominal pain, anal bleeding, blood in stool, constipation, diarrhea, nausea, rectal pain  "and vomiting.   Endocrine: Negative for cold intolerance, heat intolerance, polydipsia, polyphagia and polyuria.   Genitourinary:  Negative for decreased urine volume, difficulty urinating, dysuria, enuresis, flank pain, frequency, genital sores, hematuria, penile discharge, penile pain, penile swelling, scrotal swelling, testicular pain and urgency.   Musculoskeletal:  Negative for arthralgias, back pain, gait problem, joint swelling, myalgias, neck pain and neck stiffness.   Skin:  Negative for color change, pallor, rash and wound.   Allergic/Immunologic: Negative for environmental allergies, food allergies and immunocompromised state.   Neurological:  Negative for dizziness, tremors, seizures, syncope, facial asymmetry, speech difficulty, weakness, light-headedness, numbness and headaches.   Hematological:  Negative for adenopathy. Does not bruise/bleed easily.   Psychiatric/Behavioral:  Negative for agitation, behavioral problems, confusion, decreased concentration, dysphoric mood, hallucinations, self-injury, sleep disturbance and suicidal ideas. The patient is not nervous/anxious and is not hyperactive.        Objective    reports that he has never smoked. He has never used smokeless tobacco. He reports current alcohol use of about 2.0 standard drinks of alcohol per week. He reports that he does not use drugs.   SMOKING STATUS: Non-smoker    Physical Exam:   Vitals:Ht 185.4 cm (73\")   Wt 87.5 kg (193 lb)   BMI 25.46 kg/m²    BMI: Body mass index is 25.46 kg/m².       Incision:   The incision is well-healed and well approximated.  No signs of infection, bleeding, or erythema.    Musculoskeletal:                                            Dorsiflexion is 5/5 Bilaterally                    Plantarflexion is 5/5 bilaterally                    Hip Flexion 5/5 bilaterally.                        Neurologic:                                         The patient is alert and oriented by 3.                       " Sensation is equal bilaterally with no deficit.                        Reflexes:  2+ throughout         Assessment & Plan   Independent Review of Radiographic Studies:    Flexion-extension x-rays reviewed today not showing any signs of instability.  The joints from L4-S1 look to be fused.  There is no malalignment or slip at the levels above  Medical Decision Making:    Patient is doing very well at this point.  Patient is pleased postsurgical outcome.  Incision is clean, dry.  No signs of infection, bleeding, or erythema.    The patient will follow-up in 6 weeks with AP and lateral x-ray of the Lumbar Spine, after completing physical therapy. The patient understands instructions and limitations for the best post-operative success.    It has been a pleasure providing neurosurgical care.    Boy Jett PA-C           There are no diagnoses linked to this encounter.  No follow-ups on file.

## 2024-07-29 ENCOUNTER — OFFICE VISIT (OUTPATIENT)
Dept: NEUROSURGERY | Facility: CLINIC | Age: 74
End: 2024-07-29
Payer: MEDICARE

## 2024-07-29 VITALS — BODY MASS INDEX: 24.77 KG/M2 | HEIGHT: 74 IN | WEIGHT: 193 LBS | TEMPERATURE: 97.3 F

## 2024-07-29 DIAGNOSIS — M48.062 SPINAL STENOSIS, LUMBAR REGION, WITH NEUROGENIC CLAUDICATION: Primary | ICD-10-CM

## 2024-07-29 PROCEDURE — 99024 POSTOP FOLLOW-UP VISIT: CPT | Performed by: PHYSICIAN ASSISTANT

## 2024-07-29 NOTE — PROGRESS NOTES
Subjective   Patient ID: Isaiah Le is a 73 y.o. male is here today for follow-up for wound check/x-ray review.    HPI:      Patient is a very nice 73-year-old gentleman known to the neurosurgical practice for undergoing a L4-5 laminectomy with left L5-S1 laminoforaminotomy.  Patient had an incidental pinhole durotomy that was closed primarily.  Patient has not had issues with this since the hospitalization.  The day he was discharged she has not had any drainage.  Incision clean dry no sign infection bleed erythema.     Patient has no lower extremity pain at this point but continues to have some low back pain whenever he is up and walking.  Patient's wife reports that he has not been as active secondary to the pain.    Patient has been doing physical therapy and a little bit less disinterested in all of his activities since his heart attack last year.     The following portions of the patient's history were reviewed and updated as appropriate: allergies, current medications, past family history, past medical history, past social history, past surgical history and problem list.    Review of Systems   Constitutional:  Negative for activity change, appetite change, chills, diaphoresis, fatigue, fever and unexpected weight change.   HENT:  Negative for congestion, dental problem, drooling, ear discharge, ear pain, facial swelling, hearing loss, mouth sores, nosebleeds, postnasal drip, rhinorrhea, sinus pressure, sneezing, sore throat, tinnitus, trouble swallowing and voice change.    Eyes:  Negative for photophobia, pain, discharge, redness, itching and visual disturbance.   Respiratory:  Negative for apnea, cough, choking, chest tightness, shortness of breath, wheezing and stridor.    Cardiovascular:  Negative for chest pain, palpitations and leg swelling.   Gastrointestinal:  Negative for abdominal distention, abdominal pain, anal bleeding, blood in stool, constipation, diarrhea, nausea, rectal pain and  "vomiting.   Endocrine: Negative for cold intolerance, heat intolerance, polydipsia, polyphagia and polyuria.   Genitourinary:  Negative for decreased urine volume, difficulty urinating, dysuria, enuresis, flank pain, frequency, genital sores, hematuria, penile discharge, penile pain, penile swelling, scrotal swelling, testicular pain and urgency.   Musculoskeletal:  Positive for back pain. Negative for arthralgias, gait problem, joint swelling, myalgias, neck pain and neck stiffness.   Skin:  Negative for color change, pallor, rash and wound.   Allergic/Immunologic: Negative for environmental allergies, food allergies and immunocompromised state.   Neurological:  Negative for dizziness, tremors, seizures, syncope, facial asymmetry, speech difficulty, weakness, light-headedness, numbness and headaches.   Hematological:  Negative for adenopathy. Does not bruise/bleed easily.   Psychiatric/Behavioral:  Negative for agitation, behavioral problems, confusion, decreased concentration, dysphoric mood, hallucinations, self-injury, sleep disturbance and suicidal ideas. The patient is not nervous/anxious and is not hyperactive.    All other systems reviewed and are negative.        Objective    reports that he has never smoked. He has never used smokeless tobacco. He reports current alcohol use of about 2.0 standard drinks of alcohol per week. He reports that he does not use drugs.   SMOKING STATUS: Non-smoker    Physical Exam:   Vitals:Temp 97.3 °F (36.3 °C) (Temporal)   Ht 186.7 cm (73.5\")   Wt 87.5 kg (193 lb)   BMI 25.12 kg/m²    BMI: Body mass index is 25.12 kg/m².       Incision:   The incision is well-healed and well approximated.  No signs of infection, bleeding, or erythema.    Musculoskeletal:                                            Dorsiflexion is 5/5 Bilaterally                    Plantarflexion is 5/5 bilaterally                    Hip Flexion 5/5 bilaterally.                        Neurologic:              "                            The patient is alert and oriented by 3.                       Sensation is equal bilaterally with no deficit.                        Reflexes:  2+ throughout       Assessment & Plan   Independent Review of Radiographic Studies:    Flexion-extension x-rays do not reveal any hypermobility.  Patient does have diffuse arthritis and to space narrowing throughout the lumbar spine  Medical Decision Making:    I had Dr. Nice come in and speak with the patient secondary to his ongoing back pain.  Unfortunately there is no great option surgically at this point and he should pursue pain management.     Patient and wife were in agreement with the plan.  I will plan on seeing him back after pain management.     Diagnoses and all orders for this visit:    1. Spinal stenosis, lumbar region, with neurogenic claudication (Primary)  -     Ambulatory Referral to Pain Management      No follow-ups on file.

## 2024-08-16 ENCOUNTER — OFFICE VISIT (OUTPATIENT)
Dept: PAIN MEDICINE | Facility: CLINIC | Age: 74
End: 2024-08-16
Payer: MEDICARE

## 2024-08-16 VITALS — WEIGHT: 193 LBS | BODY MASS INDEX: 24.77 KG/M2 | HEIGHT: 74 IN

## 2024-08-16 DIAGNOSIS — M96.1 POSTLAMINECTOMY SYNDROME: Primary | ICD-10-CM

## 2024-08-16 DIAGNOSIS — M47.817 LUMBOSACRAL SPONDYLOSIS WITHOUT MYELOPATHY: ICD-10-CM

## 2024-08-16 DIAGNOSIS — M54.16 LUMBAR RADICULOPATHY: ICD-10-CM

## 2024-08-16 RX ORDER — PREGABALIN 25 MG/1
25 CAPSULE ORAL 2 TIMES DAILY
Qty: 60 CAPSULE | Refills: 1 | Status: SHIPPED | OUTPATIENT
Start: 2024-08-16

## 2024-08-16 RX ORDER — SPIRONOLACTONE 25 MG/1
TABLET ORAL EVERY 12 HOURS SCHEDULED
COMMUNITY

## 2024-08-16 NOTE — PROGRESS NOTES
Referring Physician: Boy Jett PA-C  [address] [facility]  [address]  [place] [id number]    Primary Physician: Boy Williamson MD    CHIEF COMPLAINT or REASON FOR VISIT: Back Pain (New patient)      Initial history of present illness on 08/16/2024:  Mr. Isaiah Le is 73 y.o. male who presents as a new patient referral for evaluation and treatment of chronic low back pain with radiation to left lower extremity.  Patient has a longstanding history of lumbar spinal stenosis for which she recently underwent L4-5 laminectomy and left L5-S1 foraminotomy with Dr. Abbe Nice in May 2024.  He reports that surgery completely resolved his right-sided radicular symptoms every now complains of left-sided back pain with radiation to left lower extremity.  Primary complaint is back pain.  Denies any specific provocative maneuver which causes pain.  He does not specifically recall but believes this may have started around the time of surgery.  Patient denies any bowel or bladder dysfunction, lower extremity weakness, new onset saddle anesthesia or unexplained weight loss.   He was recently evaluated again by neurosurgery, Boy joaquin, who referred for nonsurgical management.    Interval history:    Interventions:      Objective Pain Scoring:   BRIEF PAIN INVENTORY:  Total score:   Pain Score    08/16/24 1022   PainSc:   2   PainLoc: Back      PHQ-2: PHQ-2 Total Score: 0  PHQ-9: PHQ-9: Brief Depression Severity Measure Score: 0  Opioid Risk Tool:         Review of Systems:   ROS negative except as otherwise noted     Past Medical History:   Past Medical History:   Diagnosis Date    Atrial fibrillation     History of transfusion     Hyperlipidemia     Hypertension     Lumbosacral disc disease     Ulcerative colitis     Wears dentures     UPPER AND LOWER    Wears glasses     Wears hearing aid in both ears          Past Surgical History:   Past Surgical History:   Procedure Laterality Date    CATARACT  EXTRACTION Bilateral     COLONOSCOPY      WITH POLYPS REMOVED    LUMBAR LAMINECTOMY DISCECTOMY DECOMPRESSION N/A 5/15/2024    Procedure: Lumbar laminectomy L4-5 open, LEFT L5-S1 FORAMINOTOMY;  Surgeon: Abbe Nice MD;  Location: [facility];  Service: Neurosurgery;  Laterality: N/A;    REPLACEMENT TOTAL KNEE Right          Family History   Family History   Problem Relation Age of Onset    Diabetes Brother          Social History   Social History     Socioeconomic History    Marital status:    Tobacco Use    Smoking status: Never    Smokeless tobacco: Never   Vaping Use    Vaping status: Never Used   Substance and Sexual Activity    Alcohol use: Yes     Alcohol/week: 2.0 standard drinks of alcohol     Types: 2 Cans of beer per week     Comment: DAILY    Drug use: Never    Sexual activity: Defer        Medications:     Current Outpatient Medications:     amiodarone (PACERONE) 200 MG tablet, Take 1 tablet by mouth Daily., Disp: , Rfl:     atorvastatin (LIPITOR) 80 MG tablet, Take 1 tablet by mouth Every Night., Disp: , Rfl:     Budesonide ER 9 MG tablet sustained-release 24 hour, Take 9 mg by mouth Daily., Disp: , Rfl:     Eliquis 5 MG tablet tablet, Take 1 tablet by mouth 2 (Two) Times a Day. Okay to restart Monday, Disp: 60 tablet, Rfl: 0    ezetimibe (ZETIA) 10 MG tablet, Take 1 tablet by mouth Daily., Disp: , Rfl:     Farxiga 10 MG tablet, Take 10 mg by mouth Daily., Disp: , Rfl:     furosemide (LASIX) 20 MG tablet, Take 1 tablet by mouth Daily., Disp: , Rfl:     levETIRAcetam (KEPPRA) 500 MG tablet, Take 1 tablet by mouth 2 (Two) Times a Day., Disp: , Rfl:     methocarbamol (ROBAXIN) 750 MG tablet, Take 1 tablet by mouth 3 (Three) Times a Day., Disp: 60 tablet, Rfl: 0    metoprolol succinate XL (TOPROL-XL) 25 MG 24 hr tablet, Take 1 tablet by mouth Daily., Disp: , Rfl:     potassium chloride 10 MEQ CR tablet, Take 1 tablet by mouth 2 (Two) Times a Day., Disp: , Rfl:     spironolactone (ALDACTONE) 25  "MG tablet, Every 12 (Twelve) Hours., Disp: , Rfl:     pregabalin (Lyrica) 25 MG capsule, Take 1 capsule by mouth 2 (Two) Times a Day., Disp: 60 capsule, Rfl: 1        Physical Exam:     Vitals:    08/16/24 1022   Weight: 87.5 kg (193 lb)   Height: 186.7 cm (73.5\")   PainSc:   2   PainLoc: Back        General: Alert and oriented, No acute distress.   HEENT: Normocephalic, atraumatic.   Cardiovascular: No gross edema  Respiratory: Respirations are non-labored        Thoracic Spine:   Inspection: no gross abnormality  Paraspinal muscle palpation: nontender  Spinous process palpation: nontender    Lumbar Spine:   No masses or atrophy  Range of motion - Flexion normal. Extension reduced.    Facet Loading: Negative bilaterally  Facet Palpation - Nontender   Mango finger/Gaenslen's/Liu's/CYNTHIA/Thigh thrust -   Straight leg raise/slump test: Negative bilaterally  Multifidus toe-touch test:    Motor Exam:    Strength: Rate on 1-5 scale Right Left    L1/2- hip flexion 5/5  5/5    L3- knee extension 5/5  5/5    L4- ankle dorsiflexion 5/5  5/5    L5- great toe extension 5/5  5/5    S1- ankle plantarflexion 5/5  5/5    Sensory Exam: Full and equal sensation to light touch throughout.      Neurologic: Cranial Nerves II-XII are grossly intact.   Psychiatric: Cooperative.   Gait: Normal   Assistive Devices: None    Imaging Studies:   No results found for this or any previous visit.        Independent review of radiographic imaging: Available for my interpretation is a preoperative lumbar MRI dated April 17, 2014 4 demonstrating multilevel diffuse degenerative disc disease with Modic 2 endplate changes at L2, L3, L4, L5 and S1.  There is severe canal stenosis at L4/L5 and severe bilateral neuroforaminal stenosis at L4/L5.  Severe multifidus atrophy.    Impression & Plan:       08/16/2024: Isaiah Le is a 73 y.o. male with past medical history significant for atrial fibrillation (Eliquis), HLD, HTN, ulcerative colitis, " L4-5 laminectomy and left L5-S1 foraminotomies with Dr. Nice in 05/2024 who presents to the pain clinic for evaluation and treatment of persistent low back pain with radiation to left lower extremity.  MRI interpretation as above.  Evaluation consistent with lumbar spinal stenosis with neurogenic claudication, postoperative pain, postlaminectomy pain.  His current symptoms seem most consistent with postoperative pain.  Expectation is that this will continue to improve.  He did not have much in the way of provocative maneuvers.  We will trial pregabalin and acetaminophen at this time.  Can consider neuromodulation if greater than 6 months of pain postoperatively.    1. Postlaminectomy syndrome    2. Lumbosacral spondylosis without myelopathy    3. Lumbar radiculopathy        PLAN:  1. Medication Recommendations: Recommend Voltaren topical, NSAIDs, Tylenol.  Can trial turmeric 500 mg twice daily if NSAID contraindicated.  -Start pregabalin 25 mg twice daily 60 pills 1 refill  -As part of this patient's treatment plan, patient will be prescribed controlled substances. The patient has been made aware of appropriate use of such medications, including potential risk of somnolence, limited ability to drive and /or work safely, and potential for dependence or overdose. It has also been made clear that these medications are for use by this patient only, without concomitant use of alcohol or other substances unless prescribed.Controlled substance status of medication discussed with patient, discussed risks of medication including abuse potential and diversion potential and need to follow up for reevaluation appointment in order to receive further refills.  Eloy was reviewed and compliant.    2. Physical Therapy: Continue HEP    3. Psychological: defer    4. Complementary and alternative (CAM) Therapies:     5. Labs/Diagnostic studies: None indicated     6. Imaging: MRI independently interpreted and reviewed with  patient    7. Interventions: Can consider SCS trial    8. Referrals: None indicated     9. Records: Neurosurgery notes reviewed    10. Lifestyle goals:    Follow-up 2 months      [facility]  Ronak Wells MD          Quality Metrics:

## 2024-10-20 ENCOUNTER — OUTSIDE FACILITY SERVICE (OUTPATIENT)
Dept: CARDIOLOGY | Facility: CLINIC | Age: 74
End: 2024-10-20
Payer: MEDICARE

## 2024-10-20 PROCEDURE — 99222 1ST HOSP IP/OBS MODERATE 55: CPT | Performed by: INTERNAL MEDICINE

## 2024-10-28 ENCOUNTER — TELEPHONE (OUTPATIENT)
Dept: PAIN MEDICINE | Facility: CLINIC | Age: 74
End: 2024-10-28
Payer: MEDICARE

## 2024-10-28 NOTE — TELEPHONE ENCOUNTER
"LVM for patient to return call to be rescheduled.     Relay     \"HUB please reschedule to next available\"        "

## (undated) DEVICE — ADHS LIQ MASTISOL 2/3ML

## (undated) DEVICE — NEEDLE,HYPODERM,SAFETY, 25GX1.5": Brand: MEDLINE

## (undated) DEVICE — GLV SURG PREMIERPRO MIC LTX PF SZ7 BRN

## (undated) DEVICE — CONN TBG Y 5 IN 1 LF STRL

## (undated) DEVICE — STRIP,CLOSURE,WOUND,MEDI-STRIP,1/2X4: Brand: MEDLINE

## (undated) DEVICE — PAD,ARMBOARD,CONV,FOAM,2X8X20",12PR/CS: Brand: MEDLINE

## (undated) DEVICE — DRAPE,INSTRUMENT,MAGNETIC,10X16: Brand: MEDLINE

## (undated) DEVICE — DEFOGGER!" ANTI FOG KIT: Brand: DEROYAL

## (undated) DEVICE — GLV SURG PREMIERPRO MIC LTX PF SZ8 BRN

## (undated) DEVICE — TOOL MR8-14BA60 MR8 14CM BALL 6MM: Brand: MIDAS REX MR8

## (undated) DEVICE — PK NEURO DISC 10

## (undated) DEVICE — DISPOSABLE BIPOLAR FORCEPS 7 3/4" (19.7CM) SCOVILLE BAYONET, INSULATED, 1.5MM TIP AND 12 FT. (3.6M) CABLE: Brand: KIRWAN

## (undated) DEVICE — TOOL MR8-14MH30 MR8 14CM MATCH 3MM: Brand: MIDAS REX MR8

## (undated) DEVICE — ANTIBACTERIAL UNDYED BRAIDED (POLYGLACTIN 910), SYNTHETIC ABSORBABLE SUTURE: Brand: COATED VICRYL

## (undated) DEVICE — JP PERF DRN SIL FLT 10MM FULL: Brand: CARDINAL HEALTH

## (undated) DEVICE — HDRST INTUB GENTLETOUCH SLOT 7IN RT

## (undated) DEVICE — SPNG GZ WOVN 4X4IN 12PLY 10/BX STRL

## (undated) DEVICE — SYR LUERLOK 30CC

## (undated) DEVICE — INTENDED USE FOR SURGICAL MARKING ON INTACT SKIN, ALSO PROVIDES A PERMANENT METHOD OF IDENTIFYING OBJECTS IN THE OPERATING ROOM: Brand: WRITESITE® REGULAR TIP SKIN MARKER

## (undated) DEVICE — NDL SPINE 22G 31/2IN BLK

## (undated) DEVICE — PATIENT RETURN ELECTRODE, SINGLE-USE, CONTACT QUALITY MONITORING, ADULT, WITH 9FT CORD, FOR PATIENTS WEIGING OVER 33LBS. (15KG): Brand: MEGADYNE

## (undated) DEVICE — DRSNG WND GZ PAD BORDERED 4X8IN STRL

## (undated) DEVICE — GLV SURG BIOGEL LTX PF 8

## (undated) DEVICE — BLANKT WARM UPPR/BDY ARM/OUT 57X196CM

## (undated) DEVICE — APPL CHLORAPREP TINTED 26ML TEAL

## (undated) DEVICE — PCH INST SURG INVISISHIELD 2PCKT

## (undated) DEVICE — C-ARM: Brand: UNBRANDED

## (undated) DEVICE — SYRINGE,CONTROL,LL,FINGER,GRIP: Brand: MEDLINE INDUSTRIES, INC.